# Patient Record
Sex: MALE | Race: WHITE | NOT HISPANIC OR LATINO | Employment: UNEMPLOYED | URBAN - NONMETROPOLITAN AREA
[De-identification: names, ages, dates, MRNs, and addresses within clinical notes are randomized per-mention and may not be internally consistent; named-entity substitution may affect disease eponyms.]

---

## 2025-02-02 ENCOUNTER — APPOINTMENT (OUTPATIENT)
Dept: RADIOLOGY | Facility: HOSPITAL | Age: 70
End: 2025-02-02
Payer: COMMERCIAL

## 2025-02-02 ENCOUNTER — APPOINTMENT (OUTPATIENT)
Dept: CARDIOLOGY | Facility: HOSPITAL | Age: 70
End: 2025-02-02
Payer: COMMERCIAL

## 2025-02-02 ENCOUNTER — HOSPITAL ENCOUNTER (EMERGENCY)
Facility: HOSPITAL | Age: 70
Discharge: HOME | End: 2025-02-02
Attending: EMERGENCY MEDICINE
Payer: COMMERCIAL

## 2025-02-02 VITALS
DIASTOLIC BLOOD PRESSURE: 76 MMHG | BODY MASS INDEX: 24.34 KG/M2 | WEIGHT: 170 LBS | HEIGHT: 70 IN | OXYGEN SATURATION: 97 % | TEMPERATURE: 97.1 F | SYSTOLIC BLOOD PRESSURE: 133 MMHG | RESPIRATION RATE: 18 BRPM | HEART RATE: 70 BPM

## 2025-02-02 DIAGNOSIS — S62.625A CLOSED DISPLACED FRACTURE OF MIDDLE PHALANX OF LEFT RING FINGER, INITIAL ENCOUNTER: Primary | ICD-10-CM

## 2025-02-02 LAB
ALBUMIN SERPL BCP-MCNC: 4.2 G/DL (ref 3.4–5)
ALP SERPL-CCNC: 98 U/L (ref 33–136)
ALT SERPL W P-5'-P-CCNC: 25 U/L (ref 10–52)
ANION GAP SERPL CALC-SCNC: 13 MMOL/L (ref 10–20)
AST SERPL W P-5'-P-CCNC: 26 U/L (ref 9–39)
BILIRUB SERPL-MCNC: 0.4 MG/DL (ref 0–1.2)
BUN SERPL-MCNC: 22 MG/DL (ref 6–23)
CALCIUM SERPL-MCNC: 9.4 MG/DL (ref 8.6–10.3)
CHLORIDE SERPL-SCNC: 103 MMOL/L (ref 98–107)
CO2 SERPL-SCNC: 26 MMOL/L (ref 21–32)
CREAT SERPL-MCNC: 0.88 MG/DL (ref 0.5–1.3)
EGFRCR SERPLBLD CKD-EPI 2021: >90 ML/MIN/1.73M*2
ERYTHROCYTE [DISTWIDTH] IN BLOOD BY AUTOMATED COUNT: 13.4 % (ref 11.5–14.5)
GLUCOSE SERPL-MCNC: 97 MG/DL (ref 74–99)
HCT VFR BLD AUTO: 43.7 % (ref 41–52)
HGB BLD-MCNC: 14.7 G/DL (ref 13.5–17.5)
INR PPP: 4.4 (ref 0.9–1.1)
MCH RBC QN AUTO: 32.6 PG (ref 26–34)
MCHC RBC AUTO-ENTMCNC: 33.6 G/DL (ref 32–36)
MCV RBC AUTO: 97 FL (ref 80–100)
NRBC BLD-RTO: 0 /100 WBCS (ref 0–0)
PLATELET # BLD AUTO: 320 X10*3/UL (ref 150–450)
POTASSIUM SERPL-SCNC: 3.7 MMOL/L (ref 3.5–5.3)
PROT SERPL-MCNC: 7.1 G/DL (ref 6.4–8.2)
PROTHROMBIN TIME: 50.2 SECONDS (ref 9.8–12.8)
RBC # BLD AUTO: 4.51 X10*6/UL (ref 4.5–5.9)
SODIUM SERPL-SCNC: 138 MMOL/L (ref 136–145)
WBC # BLD AUTO: 9.2 X10*3/UL (ref 4.4–11.3)

## 2025-02-02 PROCEDURE — 85610 PROTHROMBIN TIME: CPT | Performed by: EMERGENCY MEDICINE

## 2025-02-02 PROCEDURE — 85027 COMPLETE CBC AUTOMATED: CPT | Performed by: EMERGENCY MEDICINE

## 2025-02-02 PROCEDURE — 72125 CT NECK SPINE W/O DYE: CPT

## 2025-02-02 PROCEDURE — 70450 CT HEAD/BRAIN W/O DYE: CPT

## 2025-02-02 PROCEDURE — 73080 X-RAY EXAM OF ELBOW: CPT | Mod: RIGHT SIDE | Performed by: RADIOLOGY

## 2025-02-02 PROCEDURE — 73130 X-RAY EXAM OF HAND: CPT | Mod: LEFT SIDE | Performed by: RADIOLOGY

## 2025-02-02 PROCEDURE — 72125 CT NECK SPINE W/O DYE: CPT | Performed by: STUDENT IN AN ORGANIZED HEALTH CARE EDUCATION/TRAINING PROGRAM

## 2025-02-02 PROCEDURE — 70450 CT HEAD/BRAIN W/O DYE: CPT | Performed by: STUDENT IN AN ORGANIZED HEALTH CARE EDUCATION/TRAINING PROGRAM

## 2025-02-02 PROCEDURE — 36415 COLL VENOUS BLD VENIPUNCTURE: CPT | Performed by: EMERGENCY MEDICINE

## 2025-02-02 PROCEDURE — 73080 X-RAY EXAM OF ELBOW: CPT | Mod: RT

## 2025-02-02 PROCEDURE — 80053 COMPREHEN METABOLIC PANEL: CPT | Performed by: EMERGENCY MEDICINE

## 2025-02-02 PROCEDURE — 93005 ELECTROCARDIOGRAM TRACING: CPT

## 2025-02-02 PROCEDURE — 73130 X-RAY EXAM OF HAND: CPT | Mod: LT

## 2025-02-02 PROCEDURE — 99285 EMERGENCY DEPT VISIT HI MDM: CPT | Mod: 25 | Performed by: EMERGENCY MEDICINE

## 2025-02-02 ASSESSMENT — PAIN SCALES - GENERAL: PAINLEVEL_OUTOF10: 7

## 2025-02-02 ASSESSMENT — PAIN - FUNCTIONAL ASSESSMENT: PAIN_FUNCTIONAL_ASSESSMENT: 0-10

## 2025-02-02 ASSESSMENT — PAIN DESCRIPTION - ORIENTATION: ORIENTATION: LEFT

## 2025-02-02 ASSESSMENT — PAIN DESCRIPTION - LOCATION: LOCATION: FINGER (COMMENT WHICH ONE)

## 2025-02-02 ASSESSMENT — COLUMBIA-SUICIDE SEVERITY RATING SCALE - C-SSRS
6. HAVE YOU EVER DONE ANYTHING, STARTED TO DO ANYTHING, OR PREPARED TO DO ANYTHING TO END YOUR LIFE?: NO
2. HAVE YOU ACTUALLY HAD ANY THOUGHTS OF KILLING YOURSELF?: NO
1. IN THE PAST MONTH, HAVE YOU WISHED YOU WERE DEAD OR WISHED YOU COULD GO TO SLEEP AND NOT WAKE UP?: NO

## 2025-02-03 LAB
ATRIAL RATE: 70 BPM
P AXIS: 62 DEGREES
P OFFSET: 187 MS
P ONSET: 119 MS
PR INTERVAL: 206 MS
Q ONSET: 222 MS
QRS COUNT: 11 BEATS
QRS DURATION: 98 MS
QT INTERVAL: 396 MS
QTC CALCULATION(BAZETT): 427 MS
QTC FREDERICIA: 417 MS
R AXIS: -4 DEGREES
T AXIS: 60 DEGREES
T OFFSET: 420 MS
VENTRICULAR RATE: 70 BPM

## 2025-02-03 NOTE — ED PROVIDER NOTES
HPI   Chief Complaint   Patient presents with    Fall     Fell due to step breaking, injured left ring finger, right elbow and hit his head, does take warfarin       HPI        Patient History   No past medical history on file.  Past Surgical History:   Procedure Laterality Date    OTHER SURGICAL HISTORY  08/03/2020    Heart surgery     No family history on file.  Social History     Tobacco Use    Smoking status: Not on file    Smokeless tobacco: Not on file   Substance Use Topics    Alcohol use: Not on file    Drug use: Not on file       Physical Exam   ED Triage Vitals [02/02/25 1839]   Temperature Heart Rate Respirations BP   35.8 °C (96.4 °F) 73 16 155/84      Pulse Ox Temp Source Heart Rate Source Patient Position   99 % Temporal Monitor Sitting      BP Location FiO2 (%)     Right arm --       Physical Exam      ED Course & MDM   Diagnoses as of 02/02/25 2005   Closed displaced fracture of middle phalanx of left ring finger, initial encounter                 No data recorded     Yves Coma Scale Score: 15 (02/02/25 1915 : Christy Vora RN)                           Medical Decision Making  EKG interpreted by me normal sinus rhythm with normal intervals and axis        Procedure  Procedures     Umesh Morrison MD  02/02/25 2005

## 2025-02-03 NOTE — ED NOTES
This RN cleaned pt laceration on 4th digit of pt L finger using dermal wound cleanser and Hibiclens. MD Tamiko schaefer bonded the pt finger laceration, then pt finger was splinted.      Colin Packer  02/02/25 2027

## 2025-02-04 ENCOUNTER — TELEPHONE (OUTPATIENT)
Dept: ORTHOPEDIC SURGERY | Facility: CLINIC | Age: 70
End: 2025-02-04
Payer: COMMERCIAL

## 2025-02-06 ENCOUNTER — OFFICE VISIT (OUTPATIENT)
Dept: ORTHOPEDIC SURGERY | Facility: HOSPITAL | Age: 70
End: 2025-02-06
Payer: COMMERCIAL

## 2025-02-06 DIAGNOSIS — S62.615A DISPLACED FRACTURE OF PROXIMAL PHALANX OF LEFT RING FINGER, INITIAL ENCOUNTER FOR CLOSED FRACTURE: Primary | ICD-10-CM

## 2025-02-06 DIAGNOSIS — S62.615A CLOSED DISPLACED FRACTURE OF PROXIMAL PHALANX OF LEFT RING FINGER, INITIAL ENCOUNTER: ICD-10-CM

## 2025-02-06 PROCEDURE — 1160F RVW MEDS BY RX/DR IN RCRD: CPT | Performed by: ORTHOPAEDIC SURGERY

## 2025-02-06 PROCEDURE — 1036F TOBACCO NON-USER: CPT | Performed by: ORTHOPAEDIC SURGERY

## 2025-02-06 PROCEDURE — 99203 OFFICE O/P NEW LOW 30 MIN: CPT | Performed by: ORTHOPAEDIC SURGERY

## 2025-02-06 PROCEDURE — 99213 OFFICE O/P EST LOW 20 MIN: CPT | Performed by: ORTHOPAEDIC SURGERY

## 2025-02-06 PROCEDURE — 1159F MED LIST DOCD IN RCRD: CPT | Performed by: ORTHOPAEDIC SURGERY

## 2025-02-06 RX ORDER — CEFAZOLIN SODIUM 2 G/100ML
2 INJECTION, SOLUTION INTRAVENOUS ONCE
OUTPATIENT
Start: 2025-02-06 | End: 2025-02-06

## 2025-02-06 ASSESSMENT — PAIN SCALES - GENERAL: PAINLEVEL_OUTOF10: 0 - NO PAIN

## 2025-02-06 ASSESSMENT — PAIN - FUNCTIONAL ASSESSMENT: PAIN_FUNCTIONAL_ASSESSMENT: 0-10

## 2025-02-07 ASSESSMENT — ENCOUNTER SYMPTOMS
SHORTNESS OF BREATH: 0
CHILLS: 0
NUMBNESS: 0
FATIGUE: 0
BRUISES/BLEEDS EASILY: 1
FEVER: 0
WHEEZING: 0

## 2025-02-07 NOTE — PROGRESS NOTES
Reason for Appointment  Chief Complaint   Patient presents with    Left Hand - Pain     History of Present Illness  New patient is a 69 y.o. male here today for evaluation of pleasant gentleman status post a fall he does take Coumadin he suffered a displaced fracture of the left ring proximal phalanx there is rotational deformity.  Isolated pain full scanning of his cervical spine head and elbow with x-rays and CT scans were negative isolated pain notices with any flexion how his finger crosses over.  We had a long discussion today isolated injury he wants this fixed.  Understands risk benefits alternatives.  We talked about this at length.  No open injuries    History reviewed. No pertinent past medical history.    Past Surgical History:   Procedure Laterality Date    OTHER SURGICAL HISTORY  08/03/2020    Heart surgery       Medication Documentation Review Audit       Reviewed by Igor Stapleton MD (Physician) on 02/07/25 at 0604      Medication Order Taking? Sig Documenting Provider Last Dose Status            No Medications to Display                                   Allergies   Allergen Reactions    Statins-Hmg-Coa Reductase Inhibitors Unknown     Muscle issues       Review of Systems   Constitutional:  Negative for chills, fatigue and fever.   Respiratory:  Negative for shortness of breath and wheezing.    Cardiovascular:  Negative for chest pain and leg swelling.   Allergic/Immunologic: Negative for immunocompromised state.   Neurological:  Negative for numbness.   Hematological:  Bruises/bleeds easily.       Exam   Exam reveals patient is alert awake no acute distress oriented person place and time mood is good move his neck shoulders elbows and wrist well left hand shows isolated swelling left ring finger with flexion it does cross over the small finger isolated tenderness proximal phalanx no open wound  Assessment   Encounter Diagnoses   Name Primary?    Displaced fracture of proximal phalanx of left ring  finger, initial encounter for closed fracture Yes    Closed displaced fracture of proximal phalanx of left ring finger, initial encounter        Plan     Plan will be for operative treatment he will stop his Coumadin a few days before but check with his cardiologist, we can do this under either local or MAC sedation.  Understands risk benefits alternatives such as nerve artery tendon damage infection and hopeful improvement in rotation but there can still be some deformity and the major risk is stiffness long-term.  Wish to proceed answered all preoperative questions

## 2025-02-10 ENCOUNTER — PRE-ADMISSION TESTING (OUTPATIENT)
Dept: PREADMISSION TESTING | Facility: HOSPITAL | Age: 70
End: 2025-02-10
Payer: COMMERCIAL

## 2025-02-10 VITALS
RESPIRATION RATE: 18 BRPM | TEMPERATURE: 97.9 F | DIASTOLIC BLOOD PRESSURE: 86 MMHG | WEIGHT: 173.72 LBS | HEART RATE: 51 BPM | BODY MASS INDEX: 24.87 KG/M2 | HEIGHT: 70 IN | SYSTOLIC BLOOD PRESSURE: 149 MMHG | OXYGEN SATURATION: 99 %

## 2025-02-10 DIAGNOSIS — S62.615A DISPLACED FRACTURE OF PROXIMAL PHALANX OF LEFT RING FINGER, INITIAL ENCOUNTER FOR CLOSED FRACTURE: ICD-10-CM

## 2025-02-10 PROCEDURE — 99203 OFFICE O/P NEW LOW 30 MIN: CPT | Performed by: NURSE PRACTITIONER

## 2025-02-10 RX ORDER — LISINOPRIL AND HYDROCHLOROTHIAZIDE 12.5; 2 MG/1; MG/1
0.5 TABLET ORAL 2 TIMES DAILY
COMMUNITY
Start: 2025-01-07

## 2025-02-10 RX ORDER — LANOLIN ALCOHOL/MO/W.PET/CERES
500 CREAM (GRAM) TOPICAL EVERY MORNING
COMMUNITY

## 2025-02-10 RX ORDER — ATORVASTATIN CALCIUM 80 MG/1
80 TABLET, FILM COATED ORAL NIGHTLY
COMMUNITY

## 2025-02-10 RX ORDER — POTASSIUM CHLORIDE 20 MEQ/1
20 TABLET, EXTENDED RELEASE ORAL
COMMUNITY

## 2025-02-10 RX ORDER — PANTOPRAZOLE SODIUM 40 MG/1
40 TABLET, DELAYED RELEASE ORAL
COMMUNITY
Start: 2020-08-05

## 2025-02-10 RX ORDER — WARFARIN 7.5 MG/1
7.5 TABLET ORAL EVERY EVENING
COMMUNITY

## 2025-02-10 RX ORDER — TAMSULOSIN HYDROCHLORIDE 0.4 MG/1
0.4 CAPSULE ORAL NIGHTLY
COMMUNITY

## 2025-02-10 ASSESSMENT — DUKE ACTIVITY SCORE INDEX (DASI)
CAN YOU WALK A BLOCK OR TWO ON LEVEL GROUND: YES
CAN YOU CLIMB A FLIGHT OF STAIRS OR WALK UP A HILL: YES
CAN YOU DO HEAVY WORK AROUND THE HOUSE LIKE SCRUBBING FLOORS OR LIFTING AND MOVING HEAVY FURNITURE: YES
CAN YOU RUN A SHORT DISTANCE: YES
CAN YOU DO LIGHT WORK AROUND THE HOUSE LIKE DUSTING OR WASHING DISHES: YES
CAN YOU DO MODERATE WORK AROUND THE HOUSE LIKE VACUUMING, SWEEPING FLOORS OR CARRYING GROCERIES: YES
TOTAL_SCORE: 50.7
DASI METS SCORE: 9
CAN YOU WALK INDOORS, SUCH AS AROUND YOUR HOUSE: YES
CAN YOU PARTICIPATE IN MODERATE RECREATIONAL ACTIVITIES LIKE GOLF, BOWLING, DANCING, DOUBLES TENNIS OR THROWING A BASEBALL OR FOOTBALL: YES
CAN YOU HAVE SEXUAL RELATIONS: YES
CAN YOU PARTICIPATE IN STRENOUS SPORTS LIKE SWIMMING, SINGLES TENNIS, FOOTBALL, BASKETBALL, OR SKIING: NO
CAN YOU TAKE CARE OF YOURSELF (EAT, DRESS, BATHE, OR USE TOILET): YES
CAN YOU DO YARD WORK LIKE RAKING LEAVES, WEEDING OR PUSHING A MOWER: YES

## 2025-02-10 ASSESSMENT — PAIN DESCRIPTION - DESCRIPTORS: DESCRIPTORS: DULL

## 2025-02-10 ASSESSMENT — PAIN SCALES - GENERAL: PAINLEVEL_OUTOF10: 2

## 2025-02-10 ASSESSMENT — PAIN - FUNCTIONAL ASSESSMENT: PAIN_FUNCTIONAL_ASSESSMENT: 0-10

## 2025-02-10 NOTE — CPM/PAT H&P
CPM/PAT Evaluation       Name: Ludwig Alcazar (Ludwig Alcazar)  /Age: 1955/69 y.o.     Visit Type:   In-Person       Chief Complaint: Closed displaced fracture of proximal phalanx of left ring finger, initial encounter     HPI  Patient is an alert and oriented 69 year old male scheduled for a  PINNING, DIGIT, HAND, PERCUTANEOUS  on 24 with Dr. Stapleton for  Closed displaced fracture of proximal phalanx of left ring finger, initial encounter . PMHX includes CABG x 2 w/aortic valve replacement in . Presents to [facility] [place] [place] PAT today for perioperative risk stratification and optimization.     No past medical history on file.    Past Surgical History:   Procedure Laterality Date    OTHER SURGICAL HISTORY  2020    Heart surgery       Patient Sexual activity questions deferred to the physician.    No family history on file.    Allergies   Allergen Reactions    Statins-Hmg-Coa Reductase Inhibitors Unknown     Muscle issues with Rosuvastatin per pt report       Prior to Admission medications    Not on File         Review of Systems   Constitutional: Negative for chills, decreased appetite, diaphoresis, fever and malaise/fatigue.   Eyes:  Negative for blurred vision and double vision.   Cardiovascular:  Negative for chest pain, claudication, cyanosis, dyspnea on exertion, irregular heartbeat, leg swelling, near-syncope and palpitations.   Respiratory:  Negative for cough, hemoptysis, shortness of breath and wheezing.    Endocrine: Negative for cold intolerance, heat intolerance, polydipsia, polyphagia and polyuria.   Gastrointestinal:  Negative for abdominal pain, constipation, diarrhea, dysphagia, nausea and vomiting.   Genitourinary:  Negative for bladder incontinence, dysuria, hematuria, incomplete emptying, nocturia, frequency, pelvic pain and urgency.   Neurological:  Negative for headaches, light-headedness, paresthesias, sensory change and weakness.   Psychiatric/Behavioral:  Negative for altered  mental status.    Musculoskeletal: Positive for myalgias, arthralgias     Vitals and nursing note reviewed.     Physical exam  Constitutional:       Appearance: Normal appearance. He is Normal.   HENT:      Head: Normocephalic and atraumatic.      Mouth/Throat:      Mouth: Mucous membranes are moist.      Pharynx: Oropharynx is clear.   Eyes:      Extraocular Movements: Extraocular movements intact.      Conjunctiva/sclera: Conjunctivae normal.      Pupils: Pupils are equal, round, and reactive to light.   Cardiovascular:      PMI at left midclavicular line. Normal rate. Regular rhythm. Normal S1. Normal S2.       Murmurs: There is no murmur.      No gallop.  No click. No rub.       No audible carotid bruit     No lower extremity edema on exam  Pulmonary:      Effort: Pulmonary effort is normal.      Breath sounds: Normal breath sounds.   Abdominal:      General: Abdomen is flat. Bowel sounds are normal.      Palpations: Abdomen is soft and non-tender  Musculoskeletal:      Cervical back: Normal range of motion and neck supple.   Skin:     General: Skin is warm and dry.      Capillary Refill: Capillary refill takes less than 2 seconds.   Neurological:      General: No focal deficit present.      Mental Status: He is alert and oriented to person, place, and time. Mental status is at baseline.   Psychiatric:         Mood and Affect: Mood normal.         Behavior: Behavior normal.         Thought Content: Thought content normal.         Judgment: Judgment normal.     Vitals and nursing note reviewed. Physical exam within normal limits.       DASI Risk Score      Flowsheet Row Pre-Admission Testing from 2/10/2025 in [facility]   Can you take care of yourself (eat, dress, bathe, or use toilet)?  2.75 filed at 02/10/2025 0950   Can you walk indoors, such as around your house? 1.75 filed at 02/10/2025 0950   Can you walk a block or two on level ground?  2.75 filed at 02/10/2025 0950   Can you climb a  flight of stairs or walk up a hill? 5.5 filed at 02/10/2025 0950   Can you run a short distance? 8 filed at 02/10/2025 0950   Can you do light work around the house like dusting or washing dishes? 2.7 filed at 02/10/2025 0950   Can you do moderate work around the house like vacuuming, sweeping floors or carrying groceries? 3.5 filed at 02/10/2025 0950   Can you do heavy work around the house like scrubbing floors or lifting and moving heavy furniture?  8 filed at 02/10/2025 0950   Can you do yard work like raking leaves, weeding or pushing a mower? 4.5 filed at 02/10/2025 0950   Can you have sexual relations? 5.25 filed at 02/10/2025 0950   Can you participate in moderate recreational activities like golf, bowling, dancing, doubles tennis or throwing a baseball or football? 6 filed at 02/10/2025 0950   Can you participate in strenous sports like swimming, singles tennis, football, basketball, or skiing? 0 filed at 02/10/2025 0950   DASI SCORE 50.7 filed at 02/10/2025 0950   METS Score (Will be calculated only when all the questions are answered) 9 filed at 02/10/2025 0950          Chris DVT Assessment      Flowsheet Row Pre-Admission Testing from 2/10/2025 in [facility]   DVT Score (IF A SCORE IS NOT CALCULATING, MUST SELECT A BMI TO COMPLETE) 5 filed at 02/10/2025 0948   Surgical Factors Major surgery planned, including arthroscopic and laproscopic (1-2 hours) filed at 02/10/2025 0948   BMI (BMI MUST BE CHOSEN) 30 or less filed at 02/10/2025 0948          Modified Frailty Index    No data to display       CHADS2 Stroke Risk  Current as of 31 minutes ago        N/A 3 to 100%: High Risk   2 to < 3%: Medium Risk   0 to < 2%: Low Risk     Last Change: N/A          This score determines the patient's risk of having a stroke if the patient has atrial fibrillation.        This score is not applicable to this patient. Components are not calculated.          Revised Cardiac Risk Index      Flowsheet Row  Pre-Admission Testing from 2/10/2025 in [facility]   High-Risk Surgery (Intraperitoneal, Intrathoracic,Suprainguinal vascular) 0 filed at 02/10/2025 0950   History of ischemic heart disease (History of MI, History of positive exercuse test, Current chest paint considered due to myocardial ischemia, Use of nitrate therapy, ECG with pathological Q Waves) 0 filed at 02/10/2025 0950   History of congestive heart failure (pulmonary edemia, bilateral rales or S3 gallop, Paroxysmal nocturnal dyspnea, CXR showing pulmonary vascular redistribution) 0 filed at 02/10/2025 0950   History of cerebrovascular disease (Prior TIA or stroke) 0 filed at 02/10/2025 0950   Pre-operative insulin treatment 0 filed at 02/10/2025 0950   Pre-operative creatinine>2 mg/dl 0 filed at 02/10/2025 0950   Revised Cardiac Risk Calculator 0 filed at 02/10/2025 0950          Apfel Simplified Score    No data to display       Risk Analysis Index Results This Encounter    No data found in the last 10 encounters.       Stop Bang Score      Flowsheet Row Pre-Admission Testing from 2/10/2025 in [facility]   Do you snore loudly? 0 filed at 02/10/2025 0911   Do you often feel tired or fatigued after your sleep? 0 filed at 02/10/2025 0911   Has anyone ever observed you stop breathing in your sleep? 0 filed at 02/10/2025 0911   Do you have or are you being treated for high blood pressure? 1 filed at 02/10/2025 0911   Recent BMI (Calculated) 24.9 filed at 02/10/2025 0911   Is BMI greater than 35 kg/m2? 0=No filed at 02/10/2025 0911   Age older than 50 years old? 1=Yes filed at 02/10/2025 0911   Is your neck circumference greater than 17 inches (Male) or 16 inches (Female)? 0 filed at 02/10/2025 0911   Gender - Male 1=Yes filed at 02/10/2025 0911   STOP-BANG Total Score 3 filed at 02/10/2025 0911          Prodigy: High Risk  Total Score: 16              Prodigy Age Score      Prodigy Gender Score          ARISCAT Score for  Postoperative Pulmonary Complications    No data to display       Kait Perioperative Risk for Myocardial Infarction or Cardiac Arrest (JENNIE)      Flowsheet Row Pre-Admission Testing from 2/10/2025 in [facility]   Calculated Age Score 1.38 filed at 02/10/2025 0951   Functional Status  0 filed at 02/10/2025 0951   ASA Class  -3.29 filed at 02/10/2025 0951   Creatinine 0 filed at 02/10/2025 0951   Type of Procedure  0.80 filed at 02/10/2025 0951   JENNIE Total Score  -6.36 filed at 02/10/2025 0951   JENNIE % 0.17 filed at 02/10/2025 0951          Assessment & Plan:    Neuro:  No diagnosis or significant findings on chart review or clinical presentation and evaluation.     HEENT/Airway:  No diagnosis or significant findings on chart review or clinical presentation and evaluation.   STOP-BANG Score-3 points moderaterisk for DEEJAY    Mallampati::  II    TM distance::  >3 FB    Neck ROM::  Full  Dentures-reports both upper and lower  Crowns-denies  Implants-denies    Cardiovascular:  HTN, CABG x 2 (atorvastatin, lisinopril-hydrochlorothiazide, potassium, warfarin)   METS: 9  RCRI: 0 points, 3.9%  risk for postoperative MACE   JENNIE: 0.17% risk for postoperative MACE  EKG -normal sinus rhythm   Clearance received from cardiology - pt to stop coumadin no more than 5 days prior to surgery   Nuclear stress test 4/2021 - per pt coumadin was stopped on 2/7/25  IMPRESSION:  1. No definite evidence of ischemia.  2. In the setting of likely diaphragmatic attenuation, a small basal  inferior wall infarct can not be definitively excluded.  3. Normal left ventricular size.  4. Left ventricular ejection fraction was calculated to be 61% which  is within normal limits.  ECHO 4/2021  CONCLUSIONS:   1. The left ventricular systolic function is normal with a 60-65% estimated ejection fraction.   2. Spectral Doppler shows an impaired relaxation pattern of left ventricular diastolic filling.   3. There is moderate mitral annular  calcification.   4. There is a mechanical aortic valve prosthesis present. Normal function. Mean gradient 8 mm Hg.   5. Mild concentric LVH. Moderate basal septal hypertrophy.   6. Dilated ascending aorta, 4.3 cm.    Pulmonary:  No diagnosis or significant findings on chart review or clinical presentation and evaluation.   ARISCAT: <26 points, 1.6% risk of in-hospital postoperative pulmonary complication  PRODIGY: Low risk for opioid induced respiratory depression  Smoking History- quit smoking >25 years ago   Deep breathing handout given    Renal/Urinary:    BPH (tamsulosin)   however, the patient is at increased risk of perioperative renal complications secondary to HTN. Preventative measures include BP monitoring, medication compliance, and hydration management.   CMP  Creatinine-0.88  GFR->90    Endocrine:  No diagnosis or significant findings on chart review or clinical presentation and evaluation.     Hematologic/Immunology:  No diagnosis or significant findings on chart review or clinical presentation and evaluation.  The patient is not a Jehovah’s witness and will accept blood and blood products if medically indicated.   History of previous blood transfusions No  CBC  H/H 14.7/43.7  Caprini Score 5, patient at Moderate for postoperative DVT. Pt supplied education/VTE handout  Anticoagulation use: Yes     Gastrointestinal:   GERD (pantoprazole)  Recreational drug use:  marijuana gummy at bedtime  Alcohol use 2 liquor drinks per week    Infectious disease:   No diagnosis or significant findings on chart review or clinical presentation and evaluation.     Musculoskeletal:   No diagnosis or significant findings on chart review or clinical presentation and evaluation.   JHFRAT score-4 points. low risk for falls    Anesthesia:  ASA 2 - Patient with mild systemic disease with no functional limitations  Anticipated anesthesia-regional  History of General anesthesia- yes  Complications- No anesthesia  complications  No family history of anesthesia complications    Abnormalities noted on PAT evaluation: No    Labs & Imaging ordered:  Nickel/metal allergy-negative  Shellfish allergy-negative    Discussed with patient medication instructions, NPO guidelines, and any questions or concerns.      time spent  35

## 2025-02-10 NOTE — H&P (VIEW-ONLY)
CPM/PAT Evaluation       Name: Ludwig Alcazar (Ludwig Alcazar)  /Age: 1955/69 y.o.     Visit Type:   In-Person       Chief Complaint: Closed displaced fracture of proximal phalanx of left ring finger, initial encounter     HPI  Patient is an alert and oriented 69 year old male scheduled for a  PINNING, DIGIT, HAND, PERCUTANEOUS  on 24 with Dr. Stapleton for  Closed displaced fracture of proximal phalanx of left ring finger, initial encounter . PMHX includes CABG x 2 w/aortic valve replacement in . Pres[facility] to [facility] today for perioperative risk stratification and optimization.     No past medical history on file.    Past Surgical History:   Procedure Laterality Date    OTHER SURGICAL HISTORY  2020    Heart surgery       Patient Sexual activity questions deferred to the physician.    No family history on file.    Allergies   Allergen Reactions    Statins-Hmg-Coa Reductase Inhibitors Unknown     Muscle issues with Rosuvastatin per pt report       Prior to Admission medications    Not on File         Review of Systems   Constitutional: Negative for chills, decreased appetite, diaphoresis, fever and malaise/fatigue.   Eyes:  Negative for blurred vision and double vision.   Cardiovascular:  Negative for chest pain, claudication, cyanosis, dyspnea on exertion, irregular heartbeat, leg swelling, near-syncope and palpitations.   Respiratory:  Negative for cough, hemoptysis, shortness of breath and wheezing.    Endocrine: Negative for cold intolerance, heat intolerance, polydipsia, polyphagia and polyuria.   Gastrointestinal:  Negative for abdominal pain, constipation, diarrhea, dysphagia, nausea and vomiting.   Genitourinary:  Negative for bladder incontinence, dysuria, hematuria, incomplete emptying, nocturia, frequency, pelvic pain and urgency.   Neurological:  Negative for headaches, light-headedness, paresthesias, sensory change and weakness.   Psychiatric/Behavioral:  Negative for altered  mental status.    Musculoskeletal: Positive for myalgias, arthralgias     Vitals and nursing note reviewed.     Physical exam  Constitutional:       Appearance: Normal appearance. He is Normal.   HENT:      Head: Normocephalic and atraumatic.      Mouth/Throat:      Mouth: Mucous membranes are moist.      Pharynx: Oropharynx is clear.   Eyes:      Extraocular Movements: Extraocular movements intact.      Conjunctiva/sclera: Conjunctivae normal.      Pupils: Pupils are equal, round, and reactive to light.   Cardiovascular:      PMI at left midclavicular line. Normal rate. Regular rhythm. Normal S1. Normal S2.       Murmurs: There is no murmur.      No gallop.  No click. No rub.       No audible carotid bruit     No lower extremity edema on exam  Pulmonary:      Effort: Pulmonary effort is normal.      Breath sounds: Normal breath sounds.   Abdominal:      General: Abdomen is flat. Bowel sounds are normal.      Palpations: Abdomen is soft and non-tender  Musculoskeletal:      Cervical back: Normal range of motion and neck supple.   Skin:     General: Skin is warm and dry.      Capillary Refill: Capillary refill takes less than 2 seconds.   Neurological:      General: No focal deficit present.      Mental Status: He is alert and oriented to person, place, and time. Mental status is at baseline.   Psychiatric:         Mood and Affect: Mood normal.         Behavior: Behavior normal.         Thought Content: Thought content normal.         Judgment: Judgment normal.     Vitals and nursing note reviewed. Physical exam within normal limits.       DASI Risk Score      Flowsheet Row Pre-Admission Testing from 2/10/2025 in [facility]   Can you take care of yourself (eat, dress, bathe, or use toilet)?  2.75 filed at 02/10/2025 0950   Can you walk indoors, such as around your house? 1.75 filed at 02/10/2025 0950   Can you walk a block or two on level ground?  2.75 filed at 02/10/2025 0950   Can you climb a  flight of stairs or walk up a hill? 5.5 filed at 02/10/2025 0950   Can you run a short distance? 8 filed at 02/10/2025 0950   Can you do light work around the house like dusting or washing dishes? 2.7 filed at 02/10/2025 0950   Can you do moderate work around the house like vacuuming, sweeping floors or carrying groceries? 3.5 filed at 02/10/2025 0950   Can you do heavy work around the house like scrubbing floors or lifting and moving heavy furniture?  8 filed at 02/10/2025 0950   Can you do yard work like raking leaves, weeding or pushing a mower? 4.5 filed at 02/10/2025 0950   Can you have sexual relations? 5.25 filed at 02/10/2025 0950   Can you participate in moderate recreational activities like golf, bowling, dancing, doubles tennis or throwing a baseball or football? 6 filed at 02/10/2025 0950   Can you participate in strenous sports like swimming, singles tennis, football, basketball, or skiing? 0 filed at 02/10/2025 0950   DASI SCORE 50.7 filed at 02/10/2025 0950   METS Score (Will be calculated only when all the questions are answered) 9 filed at 02/10/2025 0950          Chris DVT Assessment      Flowsheet Row Pre-Admission Testing from 2/10/2025 in [facility]   DVT Score (IF A SCORE IS NOT CALCULATING, MUST SELECT A BMI TO COMPLETE) 5 filed at 02/10/2025 0948   Surgical Factors Major surgery planned, including arthroscopic and laproscopic (1-2 hours) filed at 02/10/2025 0948   BMI (BMI MUST BE CHOSEN) 30 or less filed at 02/10/2025 0948          Modified Frailty Index    No data to display       CHADS2 Stroke Risk  Current as of 31 minutes ago        N/A 3 to 100%: High Risk   2 to < 3%: Medium Risk   0 to < 2%: Low Risk     Last Change: N/A          This score determines the patient's risk of having a stroke if the patient has atrial fibrillation.        This score is not applicable to this patient. Components are not calculated.          Revised Cardiac Risk Index      Flowsheet Row  Pre-Admission Testing from 2/10/2025 in [facility]   High-Risk Surgery (Intraperitoneal, Intrathoracic,Suprainguinal vascular) 0 filed at 02/10/2025 0950   History of ischemic heart disease (History of MI, History of positive exercuse test, Current chest paint considered due to myocardial ischemia, Use of nitrate therapy, ECG with pathological Q Waves) 0 filed at 02/10/2025 0950   History of congestive heart failure (pulmonary edemia, bilateral rales or S3 gallop, Paroxysmal nocturnal dyspnea, CXR showing pulmonary vascular redistribution) 0 filed at 02/10/2025 0950   History of cerebrovascular disease (Prior TIA or stroke) 0 filed at 02/10/2025 0950   Pre-operative insulin treatment 0 filed at 02/10/2025 0950   Pre-operative creatinine>2 mg/dl 0 filed at 02/10/2025 0950   Revised Cardiac Risk Calculator 0 filed at 02/10/2025 0950          Apfel Simplified Score    No data to display       Risk Analysis Index Results This Encounter    No data found in the last 10 encounters.       Stop Bang Score      Flowsheet Row Pre-Admission Testing from 2/10/2025 in [facility]   Do you snore loudly? 0 filed at 02/10/2025 0911   Do you often feel tired or fatigued after your sleep? 0 filed at 02/10/2025 0911   Has anyone ever observed you stop breathing in your sleep? 0 filed at 02/10/2025 0911   Do you have or are you being treated for high blood pressure? 1 filed at 02/10/2025 0911   Recent BMI (Calculated) 24.9 filed at 02/10/2025 0911   Is BMI greater than 35 kg/m2? 0=No filed at 02/10/2025 0911   Age older than 50 years old? 1=Yes filed at 02/10/2025 0911   Is your neck circumference greater than 17 inches (Male) or 16 inches (Female)? 0 filed at 02/10/2025 0911   Gender - Male 1=Yes filed at 02/10/2025 0911   STOP-BANG Total Score 3 filed at 02/10/2025 0911          Prodigy: High Risk  Total Score: 16              Prodigy Age Score      Prodigy Gender Score          ARISCAT Score for  Postoperative Pulmonary Complications    No data to display       Kait Perioperative Risk for Myocardial Infarction or Cardiac Arrest (JENNIE)      Flowsheet Row Pre-Admission Testing from 2/10/2025 in [facility]   Calculated Age Score 1.38 filed at 02/10/2025 0951   Functional Status  0 filed at 02/10/2025 0951   ASA Class  -3.29 filed at 02/10/2025 0951   Creatinine 0 filed at 02/10/2025 0951   Type of Procedure  0.80 filed at 02/10/2025 0951   JENNIE Total Score  -6.36 filed at 02/10/2025 0951   JENNIE % 0.17 filed at 02/10/2025 0951          Assessment & Plan:    Neuro:  No diagnosis or significant findings on chart review or clinical presentation and evaluation.     HEENT/Airway:  No diagnosis or significant findings on chart review or clinical presentation and evaluation.   STOP-BANG Score-3 points moderaterisk for DEEJAY    Mallampati::  II    TM distance::  >3 FB    Neck ROM::  Full  Dentures-reports both upper and lower  Crowns-denies  Implants-denies    Cardiovascular:  HTN, CABG x 2 (atorvastatin, lisinopril-hydrochlorothiazide, potassium, warfarin)   METS: 9  RCRI: 0 points, 3.9%  risk for postoperative MACE   JENNIE: 0.17% risk for postoperative MACE  EKG -normal sinus rhythm   Clearance received from cardiology - pt to stop coumadin no more than 5 days prior to surgery   Nuclear stress test 4/2021 - per pt coumadin was stopped on 2/7/25  IMPRESSION:  1. No definite evidence of ischemia.  2. In the setting of likely diaphragmatic attenuation, a small basal  inferior wall infarct can not be definitively excluded.  3. Normal left ventricular size.  4. Left ventricular ejection fraction was calculated to be 61% which  is within normal limits.  ECHO 4/2021  CONCLUSIONS:   1. The left ventricular systolic function is normal with a 60-65% estimated ejection fraction.   2. Spectral Doppler shows an impaired relaxation pattern of left ventricular diastolic filling.   3. There is moderate mitral annular  calcification.   4. There is a mechanical aortic valve prosthesis present. Normal function. Mean gradient 8 mm Hg.   5. Mild concentric LVH. Moderate basal septal hypertrophy.   6. Dilated ascending aorta, 4.3 cm.    Pulmonary:  No diagnosis or significant findings on chart review or clinical presentation and evaluation.   ARISCAT: <26 points, 1.6% risk of in-hospital postoperative pulmonary complication  PRODIGY: Low risk for opioid induced respiratory depression  Smoking History- quit smoking >25 years ago   Deep breathing handout given    Renal/Urinary:    BPH (tamsulosin)   however, the patient is at increased risk of perioperative renal complications secondary to HTN. Preventative measures include BP monitoring, medication compliance, and hydration management.   CMP  Creatinine-0.88  GFR->90    Endocrine:  No diagnosis or significant findings on chart review or clinical presentation and evaluation.     Hematologic/Immunology:  No diagnosis or significant findings on chart review or clinical presentation and evaluation.  The patient is not a Jehovah’s witness and will accept blood and blood products if medically indicated.   History of previous blood transfusions No  CBC  H/H 14.7/43.7  Caprini Score 5, patient at Moderate for postoperative DVT. Pt supplied education/VTE handout  Anticoagulation use: Yes     Gastrointestinal:   GERD (pantoprazole)  Recreational drug use:  marijuana gummy at bedtime  Alcohol use 2 liquor drinks per week    Infectious disease:   No diagnosis or significant findings on chart review or clinical presentation and evaluation.     Musculoskeletal:   No diagnosis or significant findings on chart review or clinical presentation and evaluation.   JHFRAT score-4 points. low risk for falls    Anesthesia:  ASA 2 - Patient with mild systemic disease with no functional limitations  Anticipated anesthesia-regional  History of General anesthesia- yes  Complications- No anesthesia  complications  No family history of anesthesia complications    Abnormalities noted on PAT evaluation: No    Labs & Imaging ordered:  Nickel/metal allergy-negative  Shellfish allergy-negative    Discussed with patient medication instructions, NPO guidelines, and any questions or concerns.      time spent  35

## 2025-02-10 NOTE — PREPROCEDURE INSTRUCTIONS
Medication List            Accurate as of February 10, 2025  9:17 AM. Always use your most recent med list.                atorvastatin 80 mg tablet  Commonly known as: Lipitor  Medication Adjustments for Surgery: Take/Use as prescribed     cyanocobalamin 1,000 mcg tablet  Commonly known as: Vitamin B-12  Additional Medication Adjustments for Surgery: Take last dose 7 days before surgery  Notes to patient: Pt should stop this medication today if not already stopped     lisinopriL-hydrochlorothiazide 20-12.5 mg tablet  Medication Adjustments for Surgery: Take last dose 1 day (24 hours) before surgery  Notes to patient: Last dose preoperatively if taken in the mornings on 2/11/25 if taken in the evenings on 2/10/25     MAGNESIUM ORAL  Medication Adjustments for Surgery: Do Not take on the morning of surgery     pantoprazole 40 mg EC tablet  Commonly known as: ProtoNix  Medication Adjustments for Surgery: Take/Use as prescribed     potassium chloride CR 20 mEq ER tablet  Commonly known as: Klor-Con M20  Medication Adjustments for Surgery: Take/Use as prescribed     tamsulosin 0.4 mg 24 hr capsule  Commonly known as: Flomax  Medication Adjustments for Surgery: Take/Use as prescribed     warfarin 7.5 mg tablet  Commonly known as: Coumadin  Additional Medication Adjustments for Surgery: Take last dose 5 days before surgery  Notes to patient: Last dose preoperatively on 2/7/25, pt is being seen on 2/10 will have pt hold if not already done so             NPO Instructions:     Do not eat any food or drink liquid after midnight the night before your surgery/procedure.  Additional Instructions:      Seven/Six Days before Surgery:  Review your medication instructions, stop indicated medications  Five Days before Surgery:  Review your medication instructions, stop indicated medications  Three Days before Surgery:  Review your medication instructions, stop indicated medications  The Day before Surgery:  No smoking or alcohol  use 24 hours before surgery  Review your medication instructions, stop indicated medications  You will be contacted regarding the time of your arrival to facility and surgery time  Do not eat any food after Midnight  Day of Surgery:  Review your medication instructions, take indicated medications  If you have diabetes, please check your fasting blood sugar upon awakening.  If fasting blood sugar is <80 mg/dl, drink 100 ml of apple juice, time limit of 2 hours before  Wear  comfortable loose fitting clothing  Do not use moisturizers, creams, lotions or perfume  All jewelry and valuables should be left at home     CONTACT SURGEON'S OFFICE IF YOU DEVELOP:  * Fever = 100.4 F   * New respiratory symptoms (e.g. cough, shortness of breath, respiratory distress, sore throat)  * Recent loss of taste or smell  *Flu like symptoms such as headache, fatigue or gastrointestinal symptoms  * You develop any open sores, shingles, burning or painful urination   AND/OR:  * You no longer wish to have the surgery.  * Any other personal circumstances change that may lead to the need to cancel or defer this surgery.  *You were admitted to any hospital within one week of your planned procedure.     SMOKING:  *Quitting smoking can make a huge difference to your health and recovery from surgery.    *If you need help with quitting, call 5-999-QUIT-NOW.     THE DAY BEFORE SURGERY:  *Do not eat any food after midnight the night before your surgery.   SURGICAL TIME:  *You will be contacted between 2 p.m. and 3 p.m. the business day before your surgery with your arrival time.  *If you haven't received a call by 3pm, call [phone]  *Scheduled surgery times may change and you will be notified if this occurs-check your personal voicemail for any updates.     ON THE MORNING OF SURGERY:  *Wear comfortable, loose fitting clothing.   *Do not use moisturizers, creams, lotions or perfume.  *All jewelry and valuables should be left at  home.  *Prosthetic devices such as contact lenses, hearing aids, dentures, eyelash extensions, hairpins and body piercing must be removed before surgery.     BRING WITH YOU:  *Photo ID and insurance card  *Current list of medications and allergies  *Pacemaker/Defibrillator/Heart stent cards  *CPAP machine and mask  *Slings/splints/crutches  *Copy of your complete Advanced Directive/DHPOA-if applicable  *Neurostimulator implant remote     PARKING AND ARRIVAL:  *Check in at the Main Entrance desk and let them know you are here for surgery.     IF YOU ARE HAVING OUTPATIENT/SAME DAY SURGERY:  *A responsible adult MUST accompany you at the time of discharge and stay with you for 24 hours after your surgery.  *You may NOT drive yourself home after surgery.  *You may use a taxi or ride sharing service ([employer]) to return home ONLY if you are accompanied by a friend or family member.  *Instructions for resuming your medications will be provided by your surgeon.     Thank you for coming to Pre Admission testing.      If I have prescribed medication please don't forget to  at your pharmacy.      Any questions about today's visit call [phone] and leave a message in the general mailbox.     Patient instructed to ambulate as soon as possible postoperatively to decrease thromboembolic risk.     JODIE Livingston-CNP     Thank you for visiting [facility].  If you have any changes to your health condition, please call the surgeons office to alert them and give them details of your symptoms.        Preoperative Fasting Guidelines     Why must I stop eating and drinking near surgery time?  With sedation, food or liquid in your stomach can enter your lungs causing serious complications  Increases nausea and vomiting     When do I need to stop eating and drinking before my surgery?  Do not eat any food after midnight the night before your surgery/procedure.        Additional Instructions:       The Day before Surgery:  -Review your medication instructions, stop indicated medications  -You will be contacted in the evening regarding the time of your arrival to facility and surgery time     Day of Surgery:  -Review your medication instructions, take indicated medications  -Wear comfortable loose fitting clothing  -Do not use moisturizers, creams, lotions or perfume  -All jewelry and valuables should be left at home                   Preoperative Brain Exercises     What are brain exercises?  A brain exercise is any activity that engages your thinking (cognitive) skills.     What types of activities are considered brain exercises?  Jigsaw puzzles, crossword puzzles, word jumble, memory games, word search, and many more.  Many can be found free online or on your phone via a mobile orestes.     Why should I do brain exercises before my surgery?  More recent research has shown brain exercise before surgery can lower the risk of postoperative delirium (confusion) which can be especially important for older adults.  Patients who did brain exercises for 5 to 10 minutes/day the days before surgery, cut their risk of postoperative delirium in half up to 1 week after surgery.                         [facility]     Preoperative Deep Breathing Exercises     Why it is important to do deep breathing exercises before my surgery?  Deep breathing exercises strengthen your breathing muscles.  This helps you to recover after your surgery and decreases the chance of breathing complications.        How are the deep breathing exercises done?  Sit straight with your back supported.  Breathe in deeply and slowly through your nose. Your lower rib cage should expand and your abdomen may move forward.  Hold that breath for 3 to 5 seconds.  Breathe out through pursed lips, slowly and completely.  Rest and repeat 10 times every hour while awake.  Rest longer if you become dizzy or lightheaded.                       [facility]     Preoperative Deep Breathing Exercises     Why it is important to do deep breathing exercises before my surgery?  Deep breathing exercises strengthen your breathing muscles.  This helps you to recover after your surgery and decreases the chance of breathing complications.        How are the deep breathing exercises done?  Sit straight with your back supported.  Breathe in deeply and slowly through your nose. Your lower rib cage should expand and your abdomen may move forward.  Hold that breath for 3 to 5 seconds.  Breathe out through pursed lips, slowly and completely.  Rest and repeat 10 times every hour while awake.  Rest longer if you become dizzy or lightheaded.        Patient Information: Incentive Spirometer  What is an incentive spirometer?  An incentive spirometer is a device used before and after surgery to “exercise” your lungs.  It helps you to take deeper breaths to expand your lungs.  Below is an example of a basic incentive spirometer.  The device you receive may differ slightly but they all function the same.    Why do I need to use an incentive spirometer?  Using your incentive spirometer prepares your lungs for surgery and helps prevent lung problems after surgery.  How do I use my incentive spirometer?  When you're using your incentive spirometer, make sure to breathe through your mouth. If you breathe through your nose, the incentive spirometer won't work properly. You can hold your nose if you have trouble.  If you feel dizzy at any time, stop and rest. Try again at a later time.  Follow the steps below:  Set up your incentive spirometer, expand the flexible tubing and connect to the outlet.  Sit upright in a chair or bed. Hold the incentive spirometer at eye level.   Put the mouthpiece in your mouth and close your lips tightly around it. Slowly breathe out (exhale) completely.  Breathe in (inhale) slowly through your mouth as deeply as you can. As you  take a breath, you will see the piston rise inside the large column. While the piston rises, the indicator should move upwards. It should stay in between the 2 arrows (see Figure).  Try to get the piston as high as you can, while keeping the indicator between the arrows.   If the indicator doesn't stay between the arrows, you're breathing either too fast or too slow.  When you get it as high as you can, hold your breath for 10 seconds, or as long as possible. While you're holding your breath, the piston will slowly fall to the base of the spirometer.  Once the piston reaches the bottom of the spirometer, breathe out slowly through your mouth. Rest for a few seconds.  Repeat 10 times. Try to get the piston to the same level with each breath.  Repeat every hour while awake  You can carefully clean the outside of the mouthpiece with an alcohol wipe or soap and water.       Patient and Family Education             Ways You Can Help Prevent Blood Clots                    This handout explains some simple things you can do to help prevent blood clots.      Blood clots are blockages that can form in the body's veins. When a blood clot forms in your deep veins, it may be called a deep vein thrombosis, or DVT for short. Blood clots can happen in any part of the body where blood flows, but they are most common in the arms and legs. If a piece of a blood clot breaks free and travels to the lungs, it is called a pulmonary embolus (PE). A PE can be a very serious problem.         Being in the hospital or having surgery can raise your chances of getting a blood clot because you may not be well enough to move around as much as you normally do.         Ways you can help prevent blood clots in the hospital           Wearing SCDs. SCDs stands for Sequential Compression Devices.   SCDs are special sleeves that wrap around your legs  They attach to a pump that fills them with air to gently squeeze your legs every few minutes.   This  helps return the blood in your legs to your heart.   SCDs should only be taken off when walking or bathing.   SCDs may not be comfortable, but they can help save your life.                                            Wearing compression stockings - if your doctor orders them. These special snug fitting stockings gently squeeze your legs to help blood flow.       Walking. Walking helps move the blood in your legs.   If your doctor says it is ok, try walking the halls at least   5 times a day. Ask us to help you get up, so you don't fall.      Taking any blood thinning medicines your doctor orders.        Page 1 of 2            [facility]; 3/23   Ways you can help prevent blood clots at home         Wearing compression stockings - if your doctor orders them. ? Walking - to help move the blood in your legs.       Taking any blood thinning medicines your doctor orders.      Signs of a blood clot or PE        Tell your doctor or nurse know right away if you have of the problems listed below.    If you are at home, seek medical care right away. Call 911 for chest pain or problems breathing.          Signs of a blood clot (DVT) - such as pain,  swelling, redness or warmth in your arm or leg      Signs of a pulmonary embolism (PE) - such as chest pain or feeling short of breath

## 2025-02-10 NOTE — H&P (VIEW-ONLY)
CPM/PAT Evaluation       Name: Ludwig Alcazar (Ludwig Alcazar)  /Age: 1955/69 y.o.     Visit Type:   In-Person       Chief Complaint: Closed displaced fracture of proximal phalanx of left ring finger, initial encounter     HPI  Patient is an alert and oriented 69 year old male scheduled for a  PINNING, DIGIT, HAND, PERCUTANEOUS  on 24 with Dr. Stapleton for  Closed displaced fracture of proximal phalanx of left ring finger, initial encounter . PMHX includes CABG x 2 w/aortic valve replacement in . Presents to [facility] PAT today for perioperative risk stratification and optimization.     No past medical history on file.    Past Surgical History:   Procedure Laterality Date    OTHER SURGICAL HISTORY  2020    Heart surgery       Patient Sexual activity questions deferred to the physician.    No family history on file.    Allergies   Allergen Reactions    Statins-Hmg-Coa Reductase Inhibitors Unknown     Muscle issues with Rosuvastatin per pt report       Prior to Admission medications    Not on File         Review of Systems   Constitutional: Negative for chills, decreased appetite, diaphoresis, fever and malaise/fatigue.   Eyes:  Negative for blurred vision and double vision.   Cardiovascular:  Negative for chest pain, claudication, cyanosis, dyspnea on exertion, irregular heartbeat, leg swelling, near-syncope and palpitations.   Respiratory:  Negative for cough, hemoptysis, shortness of breath and wheezing.    Endocrine: Negative for cold intolerance, heat intolerance, polydipsia, polyphagia and polyuria.   Gastrointestinal:  Negative for abdominal pain, constipation, diarrhea, dysphagia, nausea and vomiting.   Genitourinary:  Negative for bladder incontinence, dysuria, hematuria, incomplete emptying, nocturia, frequency, pelvic pain and urgency.   Neurological:  Negative for headaches, light-headedness, paresthesias, sensory change and weakness.   Psychiatric/Behavioral:  Negative for altered  mental status.    Musculoskeletal: Positive for myalgias, arthralgias     Vitals and nursing note reviewed.     Physical exam  Constitutional:       Appearance: Normal appearance. He is Normal.   HENT:      Head: Normocephalic and atraumatic.      Mouth/Throat:      Mouth: Mucous membranes are moist.      Pharynx: Oropharynx is clear.   Eyes:      Extraocular Movements: Extraocular movements intact.      Conjunctiva/sclera: Conjunctivae normal.      Pupils: Pupils are equal, round, and reactive to light.   Cardiovascular:      PMI at left midclavicular line. Normal rate. Regular rhythm. Normal S1. Normal S2.       Murmurs: There is no murmur.      No gallop.  No click. No rub.       No audible carotid bruit     No lower extremity edema on exam  Pulmonary:      Effort: Pulmonary effort is normal.      Breath sounds: Normal breath sounds.   Abdominal:      General: Abdomen is flat. Bowel sounds are normal.      Palpations: Abdomen is soft and non-tender  Musculoskeletal:      Cervical back: Normal range of motion and neck supple.   Skin:     General: Skin is warm and dry.      Capillary Refill: Capillary refill takes less than 2 seconds.   Neurological:      General: No focal deficit present.      Mental Status: He is alert and oriented to person, place, and time. Mental status is at baseline.   Psychiatric:         Mood and Affect: Mood normal.         Behavior: Behavior normal.         Thought Content: Thought content normal.         Judgment: Judgment normal.     Vitals and nursing note reviewed. Physical exam within normal limits.       DASI Risk Score      Flowsheet Row Pre-Admission Testing from 2/10/2025 in [facility]   Can you take care of yourself (eat, dress, bathe, or use toilet)?  2.75 filed at 02/10/2025 0950   Can you walk indoors, such as around your house? 1.75 filed at 02/10/2025 0950   Can you walk a block or two on level ground?  2.75 filed at 02/10/2025 0950   Can you climb a  flight of stairs or walk up a hill? 5.5 filed at 02/10/2025 0950   Can you run a short distance? 8 filed at 02/10/2025 0950   Can you do light work around the house like dusting or washing dishes? 2.7 filed at 02/10/2025 0950   Can you do moderate work around the house like vacuuming, sweeping floors or carrying groceries? 3.5 filed at 02/10/2025 0950   Can you do heavy work around the house like scrubbing floors or lifting and moving heavy furniture?  8 filed at 02/10/2025 0950   Can you do yard work like raking leaves, weeding or pushing a mower? 4.5 filed at 02/10/2025 0950   Can you have sexual relations? 5.25 filed at 02/10/2025 0950   Can you participate in moderate recreational activities like golf, bowling, dancing, doubles tennis or throwing a baseball or football? 6 filed at 02/10/2025 0950   Can you participate in strenous sports like swimming, singles tennis, football, basketball, or skiing? 0 filed at 02/10/2025 0950   DASI SCORE 50.7 filed at 02/10/2025 0950   METS Score (Will be calculated only when all the questions are answered) 9 filed at 02/10/2025 0950          Chris DVT Assessment      Flowsheet Row Pre-Admission Testing from 2/10/2025 in [facility]   DVT Score (IF A SCORE IS NOT CALCULATING, MUST SELECT A BMI TO COMPLETE) 5 filed at 02/10/2025 0948   Surgical Factors Major surgery planned, including arthroscopic and laproscopic (1-2 hours) filed at 02/10/2025 0948   BMI (BMI MUST BE CHOSEN) 30 or less filed at 02/10/2025 0948          Modified Frailty Index    No data to display       CHADS2 Stroke Risk  Current as of 31 minutes ago        N/A 3 to 100%: High Risk   2 to < 3%: Medium Risk   0 to < 2%: Low Risk     Last Change: N/A          This score determines the patient's risk of having a stroke if the patient has atrial fibrillation.        This score is not applicable to this patient. Components are not calculated.          Revised Cardiac Risk Index      Flowsheet Row  Pre-Admission Testing from 2/10/2025 in [facility]   High-Risk Surgery (Intraperitoneal, Intrathoracic,Suprainguinal vascular) 0 filed at 02/10/2025 0950   History of ischemic heart disease (History of MI, History of positive exercuse test, Current chest paint considered due to myocardial ischemia, Use of nitrate therapy, ECG with pathological Q Waves) 0 filed at 02/10/2025 0950   History of congestive heart failure (pulmonary edemia, bilateral rales or S3 gallop, Paroxysmal nocturnal dyspnea, CXR showing pulmonary vascular redistribution) 0 filed at 02/10/2025 0950   History of cerebrovascular disease (Prior TIA or stroke) 0 filed at 02/10/2025 0950   Pre-operative insulin treatment 0 filed at 02/10/2025 0950   Pre-operative creatinine>2 mg/dl 0 filed at 02/10/2025 0950   Revised Cardiac Risk Calculator 0 filed at 02/10/2025 0950          Apfel Simplified Score    No data to display       Risk Analysis Index Results This Encounter    No data found in the last 10 encounters.       Stop Bang Score      Flowsheet Row Pre-Admission Testing from 2/10/2025 in [facility]   Do you snore loudly? 0 filed at 02/10/2025 0911   Do you often feel tired or fatigued after your sleep? 0 filed at 02/10/2025 0911   Has anyone ever observed you stop breathing in your sleep? 0 filed at 02/10/2025 0911   Do you have or are you being treated for high blood pressure? 1 filed at 02/10/2025 0911   Recent BMI (Calculated) 24.9 filed at 02/10/2025 0911   Is BMI greater than 35 kg/m2? 0=No filed at 02/10/2025 0911   Age older than 50 years old? 1=Yes filed at 02/10/2025 0911   Is your neck circumference greater than 17 inches (Male) or 16 inches (Female)? 0 filed at 02/10/2025 0911   Gender - Male 1=Yes filed at 02/10/2025 0911   STOP-BANG Total Score 3 filed at 02/10/2025 0911          Prodigy: High Risk  Total Score: 16              Prodigy Age Score      Prodigy Gender Score          ARISCAT Score for  Postoperative Pulmonary Complications    No data to display       Kait Perioperative Risk for Myocardial Infarction or Cardiac Arrest (JENNIE)      Flowsheet Row Pre-Admission Testing from 2/10/2025 in [facility]   Calculated Age Score 1.38 filed at 02/10/2025 0951   Functional Status  0 filed at 02/10/2025 0951   ASA Class  -3.29 filed at 02/10/2025 0951   Creatinine 0 filed at 02/10/2025 0951   Type of Procedure  0.80 filed at 02/10/2025 0951   JENNIE Total Score  -6.36 filed at 02/10/2025 0951   JENNIE % 0.17 filed at 02/10/2025 0951          Assessment & Plan:    Neuro:  No diagnosis or significant findings on chart review or clinical presentation and evaluation.     HEENT/Airway:  No diagnosis or significant findings on chart review or clinical presentation and evaluation.   STOP-BANG Score-3 points moderaterisk for DEEJAY    Mallampati::  II    TM distance::  >3 FB    Neck ROM::  Full  Dentures-reports both upper and lower  Crowns-denies  Implants-denies    Cardiovascular:  HTN, CABG x 2 (atorvastatin, lisinopril-hydrochlorothiazide, potassium, warfarin)   METS: 9  RCRI: 0 points, 3.9%  risk for postoperative MACE   JENNIE: 0.17% risk for postoperative MACE  EKG -normal sinus rhythm   Clearance received from cardiology - pt to stop coumadin no more than 5 days prior to surgery   Nuclear stress test 4/2021 - per pt coumadin was stopped on 2/7/25  IMPRESSION:  1. No definite evidence of ischemia.  2. In the setting of likely diaphragmatic attenuation, a small basal  inferior wall infarct can not be definitively excluded.  3. Normal left ventricular size.  4. Left ventricular ejection fraction was calculated to be 61% which  is within normal limits.  ECHO 4/2021  CONCLUSIONS:   1. The left ventricular systolic function is normal with a 60-65% estimated ejection fraction.   2. Spectral Doppler shows an impaired relaxation pattern of left ventricular diastolic filling.   3. There is moderate mitral annular  calcification.   4. There is a mechanical aortic valve prosthesis present. Normal function. Mean gradient 8 mm Hg.   5. Mild concentric LVH. Moderate basal septal hypertrophy.   6. Dilated ascending aorta, 4.3 cm.    Pulmonary:  No diagnosis or significant findings on chart review or clinical presentation and evaluation.   ARISCAT: <26 points, 1.6% risk of in-hospital postoperative pulmonary complication  PRODIGY: Low risk for opioid induced respiratory depression  Smoking History- quit smoking >25 years ago   Deep breathing handout given    Renal/Urinary:    BPH (tamsulosin)   however, the patient is at increased risk of perioperative renal complications secondary to HTN. Preventative measures include BP monitoring, medication compliance, and hydration management.   CMP  Creatinine-0.88  GFR->90    Endocrine:  No diagnosis or significant findings on chart review or clinical presentation and evaluation.     Hematologic/Immunology:  No diagnosis or significant findings on chart review or clinical presentation and evaluation.  The patient is not a Jehovah’s witness and will accept blood and blood products if medically indicated.   History of previous blood transfusions No  CBC  H/H 14.7/43.7  Caprini Score 5, patient at Moderate for postoperative DVT. Pt supplied education/VTE handout  Anticoagulation use: Yes     Gastrointestinal:   GERD (pantoprazole)  Recreational drug use:  marijuana gummy at bedtime  Alcohol use 2 liquor drinks per week    Infectious disease:   No diagnosis or significant findings on chart review or clinical presentation and evaluation.     Musculoskeletal:   No diagnosis or significant findings on chart review or clinical presentation and evaluation.   JHFRAT score-4 points. low risk for falls    Anesthesia:  ASA 2 - Patient with mild systemic disease with no functional limitations  Anticipated anesthesia-regional  History of General anesthesia- yes  Complications- No anesthesia  complications  No family history of anesthesia complications    Abnormalities noted on PAT evaluation: No    Labs & Imaging ordered:  Nickel/metal allergy-negative  Shellfish allergy-negative    Discussed with patient medication instructions, NPO guidelines, and any questions or concerns.      time spent  35

## 2025-02-12 ENCOUNTER — HOSPITAL ENCOUNTER (OUTPATIENT)
Facility: HOSPITAL | Age: 70
Setting detail: OUTPATIENT SURGERY
Discharge: HOME | End: 2025-02-12
Attending: ORTHOPAEDIC SURGERY | Admitting: ORTHOPAEDIC SURGERY
Payer: COMMERCIAL

## 2025-02-12 ENCOUNTER — ANESTHESIA EVENT (OUTPATIENT)
Dept: OPERATING ROOM | Facility: HOSPITAL | Age: 70
End: 2025-02-12
Payer: COMMERCIAL

## 2025-02-12 ENCOUNTER — APPOINTMENT (OUTPATIENT)
Dept: RADIOLOGY | Facility: HOSPITAL | Age: 70
End: 2025-02-12
Payer: COMMERCIAL

## 2025-02-12 ENCOUNTER — ANESTHESIA (OUTPATIENT)
Dept: OPERATING ROOM | Facility: HOSPITAL | Age: 70
End: 2025-02-12
Payer: COMMERCIAL

## 2025-02-12 VITALS
HEIGHT: 70 IN | DIASTOLIC BLOOD PRESSURE: 88 MMHG | WEIGHT: 171.74 LBS | TEMPERATURE: 97.7 F | SYSTOLIC BLOOD PRESSURE: 138 MMHG | RESPIRATION RATE: 18 BRPM | HEART RATE: 65 BPM | BODY MASS INDEX: 24.59 KG/M2 | OXYGEN SATURATION: 98 %

## 2025-02-12 DIAGNOSIS — S62.615A CLOSED DISPLACED FRACTURE OF PROXIMAL PHALANX OF LEFT RING FINGER, INITIAL ENCOUNTER: ICD-10-CM

## 2025-02-12 DIAGNOSIS — S62.615A DISPLACED FRACTURE OF PROXIMAL PHALANX OF LEFT RING FINGER, INITIAL ENCOUNTER FOR CLOSED FRACTURE: Primary | ICD-10-CM

## 2025-02-12 PROBLEM — M10.9 GOUT: Status: ACTIVE | Noted: 2025-02-12

## 2025-02-12 PROBLEM — E78.5 HYPERLIPIDEMIA: Status: ACTIVE | Noted: 2025-02-12

## 2025-02-12 PROBLEM — I25.10 CAD (CORONARY ARTERY DISEASE): Status: ACTIVE | Noted: 2025-02-12

## 2025-02-12 PROBLEM — I10 HTN (HYPERTENSION): Status: ACTIVE | Noted: 2025-02-12

## 2025-02-12 PROBLEM — K44.9 HIATAL HERNIA: Status: ACTIVE | Noted: 2025-02-12

## 2025-02-12 PROBLEM — Z95.1 HISTORY OF CORONARY ARTERY BYPASS GRAFT: Status: ACTIVE | Noted: 2025-02-12

## 2025-02-12 PROBLEM — Z79.01 ANTICOAGULANT LONG-TERM USE: Status: ACTIVE | Noted: 2025-02-12

## 2025-02-12 PROCEDURE — 7100000002 HC RECOVERY ROOM TIME - EACH INCREMENTAL 1 MINUTE: Performed by: ORTHOPAEDIC SURGERY

## 2025-02-12 PROCEDURE — 7100000009 HC PHASE TWO TIME - INITIAL BASE CHARGE: Performed by: ORTHOPAEDIC SURGERY

## 2025-02-12 PROCEDURE — 3600000003 HC OR TIME - INITIAL BASE CHARGE - PROCEDURE LEVEL THREE: Performed by: ORTHOPAEDIC SURGERY

## 2025-02-12 PROCEDURE — A26727 PR PERCUT RX PROX/MID FING SHFT FX: Performed by: STUDENT IN AN ORGANIZED HEALTH CARE EDUCATION/TRAINING PROGRAM

## 2025-02-12 PROCEDURE — 3600000008 HC OR TIME - EACH INCREMENTAL 1 MINUTE - PROCEDURE LEVEL THREE: Performed by: ORTHOPAEDIC SURGERY

## 2025-02-12 PROCEDURE — 3700000001 HC GENERAL ANESTHESIA TIME - INITIAL BASE CHARGE: Performed by: ORTHOPAEDIC SURGERY

## 2025-02-12 PROCEDURE — 7100000001 HC RECOVERY ROOM TIME - INITIAL BASE CHARGE: Performed by: ORTHOPAEDIC SURGERY

## 2025-02-12 PROCEDURE — 2500000004 HC RX 250 GENERAL PHARMACY W/ HCPCS (ALT 636 FOR OP/ED): Performed by: NURSE ANESTHETIST, CERTIFIED REGISTERED

## 2025-02-12 PROCEDURE — A26727 PR PERCUT RX PROX/MID FING SHFT FX: Performed by: NURSE ANESTHETIST, CERTIFIED REGISTERED

## 2025-02-12 PROCEDURE — 2500000004 HC RX 250 GENERAL PHARMACY W/ HCPCS (ALT 636 FOR OP/ED): Performed by: PHYSICIAN ASSISTANT

## 2025-02-12 PROCEDURE — 2500000004 HC RX 250 GENERAL PHARMACY W/ HCPCS (ALT 636 FOR OP/ED): Performed by: ORTHOPAEDIC SURGERY

## 2025-02-12 PROCEDURE — 3700000002 HC GENERAL ANESTHESIA TIME - EACH INCREMENTAL 1 MINUTE: Performed by: ORTHOPAEDIC SURGERY

## 2025-02-12 PROCEDURE — 7100000010 HC PHASE TWO TIME - EACH INCREMENTAL 1 MINUTE: Performed by: ORTHOPAEDIC SURGERY

## 2025-02-12 PROCEDURE — 26727 TREAT FINGER FRACTURE EACH: CPT | Performed by: ORTHOPAEDIC SURGERY

## 2025-02-12 RX ORDER — LIDOCAINE HYDROCHLORIDE 10 MG/ML
INJECTION, SOLUTION INFILTRATION; PERINEURAL AS NEEDED
Status: DISCONTINUED | OUTPATIENT
Start: 2025-02-12 | End: 2025-02-12

## 2025-02-12 RX ORDER — MIDAZOLAM HYDROCHLORIDE 1 MG/ML
INJECTION, SOLUTION INTRAMUSCULAR; INTRAVENOUS AS NEEDED
Status: DISCONTINUED | OUTPATIENT
Start: 2025-02-12 | End: 2025-02-12

## 2025-02-12 RX ORDER — OXYCODONE AND ACETAMINOPHEN 5; 325 MG/1; MG/1
1 TABLET ORAL EVERY 4 HOURS PRN
Status: DISCONTINUED | OUTPATIENT
Start: 2025-02-12 | End: 2025-02-12 | Stop reason: HOSPADM

## 2025-02-12 RX ORDER — BUPIVACAINE HYDROCHLORIDE 5 MG/ML
INJECTION, SOLUTION PERINEURAL AS NEEDED
Status: DISCONTINUED | OUTPATIENT
Start: 2025-02-12 | End: 2025-02-12 | Stop reason: HOSPADM

## 2025-02-12 RX ORDER — PROPOFOL 10 MG/ML
INJECTION, EMULSION INTRAVENOUS AS NEEDED
Status: DISCONTINUED | OUTPATIENT
Start: 2025-02-12 | End: 2025-02-12

## 2025-02-12 RX ORDER — DOXYCYCLINE 100 MG/1
100 CAPSULE ORAL 2 TIMES DAILY
Qty: 14 CAPSULE | Refills: 0 | Status: SHIPPED | OUTPATIENT
Start: 2025-02-12 | End: 2025-02-19

## 2025-02-12 RX ORDER — ONDANSETRON HYDROCHLORIDE 2 MG/ML
4 INJECTION, SOLUTION INTRAVENOUS ONCE AS NEEDED
Status: DISCONTINUED | OUTPATIENT
Start: 2025-02-12 | End: 2025-02-12 | Stop reason: HOSPADM

## 2025-02-12 RX ORDER — LIDOCAINE HYDROCHLORIDE 10 MG/ML
INJECTION, SOLUTION INFILTRATION; PERINEURAL AS NEEDED
Status: DISCONTINUED | OUTPATIENT
Start: 2025-02-12 | End: 2025-02-12 | Stop reason: HOSPADM

## 2025-02-12 RX ORDER — ONDANSETRON HYDROCHLORIDE 2 MG/ML
INJECTION, SOLUTION INTRAVENOUS AS NEEDED
Status: DISCONTINUED | OUTPATIENT
Start: 2025-02-12 | End: 2025-02-12

## 2025-02-12 RX ORDER — FENTANYL CITRATE 50 UG/ML
INJECTION, SOLUTION INTRAMUSCULAR; INTRAVENOUS AS NEEDED
Status: DISCONTINUED | OUTPATIENT
Start: 2025-02-12 | End: 2025-02-12

## 2025-02-12 RX ORDER — DROPERIDOL 2.5 MG/ML
0.62 INJECTION, SOLUTION INTRAMUSCULAR; INTRAVENOUS ONCE AS NEEDED
Status: DISCONTINUED | OUTPATIENT
Start: 2025-02-12 | End: 2025-02-12 | Stop reason: HOSPADM

## 2025-02-12 RX ORDER — FENTANYL CITRATE 50 UG/ML
12.5 INJECTION, SOLUTION INTRAMUSCULAR; INTRAVENOUS EVERY 5 MIN PRN
Status: DISCONTINUED | OUTPATIENT
Start: 2025-02-12 | End: 2025-02-12 | Stop reason: HOSPADM

## 2025-02-12 RX ORDER — ROCURONIUM BROMIDE 10 MG/ML
INJECTION, SOLUTION INTRAVENOUS AS NEEDED
Status: DISCONTINUED | OUTPATIENT
Start: 2025-02-12 | End: 2025-02-12

## 2025-02-12 RX ORDER — ALBUTEROL SULFATE 0.83 MG/ML
2.5 SOLUTION RESPIRATORY (INHALATION) ONCE AS NEEDED
Status: DISCONTINUED | OUTPATIENT
Start: 2025-02-12 | End: 2025-02-12 | Stop reason: HOSPADM

## 2025-02-12 RX ORDER — CEFAZOLIN SODIUM 2 G/100ML
2 INJECTION, SOLUTION INTRAVENOUS ONCE
Status: COMPLETED | OUTPATIENT
Start: 2025-02-12 | End: 2025-02-12

## 2025-02-12 RX ORDER — SODIUM CHLORIDE, SODIUM LACTATE, POTASSIUM CHLORIDE, CALCIUM CHLORIDE 600; 310; 30; 20 MG/100ML; MG/100ML; MG/100ML; MG/100ML
50 INJECTION, SOLUTION INTRAVENOUS CONTINUOUS
Status: DISCONTINUED | OUTPATIENT
Start: 2025-02-12 | End: 2025-02-12 | Stop reason: HOSPADM

## 2025-02-12 RX ORDER — HYDROCODONE BITARTRATE AND ACETAMINOPHEN 5; 325 MG/1; MG/1
1 TABLET ORAL EVERY 8 HOURS PRN
Qty: 15 TABLET | Refills: 0 | Status: SHIPPED | OUTPATIENT
Start: 2025-02-12 | End: 2025-02-17

## 2025-02-12 RX ORDER — LIDOCAINE HYDROCHLORIDE 10 MG/ML
0.1 INJECTION, SOLUTION EPIDURAL; INFILTRATION; INTRACAUDAL; PERINEURAL ONCE
Status: DISCONTINUED | OUTPATIENT
Start: 2025-02-12 | End: 2025-02-12 | Stop reason: HOSPADM

## 2025-02-12 RX ORDER — FENTANYL CITRATE 50 UG/ML
25 INJECTION, SOLUTION INTRAMUSCULAR; INTRAVENOUS EVERY 5 MIN PRN
Status: DISCONTINUED | OUTPATIENT
Start: 2025-02-12 | End: 2025-02-12 | Stop reason: HOSPADM

## 2025-02-12 RX ORDER — LABETALOL HYDROCHLORIDE 5 MG/ML
5 INJECTION, SOLUTION INTRAVENOUS ONCE AS NEEDED
Status: DISCONTINUED | OUTPATIENT
Start: 2025-02-12 | End: 2025-02-12 | Stop reason: HOSPADM

## 2025-02-12 RX ORDER — FENTANYL CITRATE 50 UG/ML
50 INJECTION, SOLUTION INTRAMUSCULAR; INTRAVENOUS EVERY 5 MIN PRN
Status: DISCONTINUED | OUTPATIENT
Start: 2025-02-12 | End: 2025-02-12 | Stop reason: HOSPADM

## 2025-02-12 RX ADMIN — DEXAMETHASONE SODIUM PHOSPHATE 8 MG: 4 INJECTION, SOLUTION INTRAMUSCULAR; INTRAVENOUS at 10:15

## 2025-02-12 RX ADMIN — PROPOFOL 200 MG: 10 INJECTION, EMULSION INTRAVENOUS at 10:09

## 2025-02-12 RX ADMIN — FENTANYL CITRATE 50 MCG: 50 INJECTION INTRAMUSCULAR; INTRAVENOUS at 10:16

## 2025-02-12 RX ADMIN — SODIUM CHLORIDE, POTASSIUM CHLORIDE, SODIUM LACTATE AND CALCIUM CHLORIDE: 600; 310; 30; 20 INJECTION, SOLUTION INTRAVENOUS at 10:04

## 2025-02-12 RX ADMIN — MIDAZOLAM 2 MG: 1 INJECTION INTRAMUSCULAR; INTRAVENOUS at 10:02

## 2025-02-12 RX ADMIN — LIDOCAINE HYDROCHLORIDE 50 MG: 10 INJECTION, SOLUTION INFILTRATION; PERINEURAL at 10:08

## 2025-02-12 RX ADMIN — CEFAZOLIN SODIUM 2 G: 2 INJECTION, SOLUTION INTRAVENOUS at 10:14

## 2025-02-12 RX ADMIN — ROCURONIUM BROMIDE 50 MG: 10 INJECTION INTRAVENOUS at 10:10

## 2025-02-12 RX ADMIN — ONDANSETRON 4 MG: 2 INJECTION, SOLUTION INTRAMUSCULAR; INTRAVENOUS at 10:15

## 2025-02-12 RX ADMIN — SUGAMMADEX 200 MG: 100 INJECTION, SOLUTION INTRAVENOUS at 10:32

## 2025-02-12 RX ADMIN — FENTANYL CITRATE 50 MCG: 50 INJECTION INTRAMUSCULAR; INTRAVENOUS at 10:08

## 2025-02-12 ASSESSMENT — PAIN SCALES - GENERAL
PAINLEVEL_OUTOF10: 0 - NO PAIN

## 2025-02-12 ASSESSMENT — COLUMBIA-SUICIDE SEVERITY RATING SCALE - C-SSRS
1. IN THE PAST MONTH, HAVE YOU WISHED YOU WERE DEAD OR WISHED YOU COULD GO TO SLEEP AND NOT WAKE UP?: NO
2. HAVE YOU ACTUALLY HAD ANY THOUGHTS OF KILLING YOURSELF?: NO

## 2025-02-12 ASSESSMENT — PAIN - FUNCTIONAL ASSESSMENT
PAIN_FUNCTIONAL_ASSESSMENT: 0-10

## 2025-02-12 NOTE — ANESTHESIA POSTPROCEDURE EVALUATION
Patient: Ludwig Alcazar    Procedure Summary       Date: 02/12/25 Room / Location: [facility] / [facility]    Anesthesia Start: 1004 Anesthesia Stop: 1041    Procedure: PINNING, DIGIT, HAND, PERCUTANEOUS ( Osteomed canulated screw full set and K-wires , Mini C- Arm ) (Left: Hand) Diagnosis:       Closed displaced fracture of proximal phalanx of left ring finger, initial encounter      (Closed displaced fracture of proximal phalanx of left ring finger, initial encounter [F06.199R])    Surgeons: Igor Stapleton MD Responsible Provider: Bubba Olmedo MD    Anesthesia Type: MAC ASA Status: 3            Anesthesia Type: MAC    Vitals Value Taken Time   /71 02/12/25 1050   Temp 35.9 °C (96.6 °F) 02/12/25 1039   Pulse 66 02/12/25 1050   Resp 17 02/12/25 1039   SpO2 95 % 02/12/25 1050       Anesthesia Post Evaluation    Patient location during evaluation: PACU  Patient participation: complete - patient participated  Level of consciousness: awake and alert  Pain management: adequate  Multimodal analgesia pain management approach  Airway patency: patent  Cardiovascular status: acceptable  Respiratory status: acceptable  Hydration status: acceptable  Postoperative Nausea and Vomiting: none        There were no known notable events for this encounter.

## 2025-02-12 NOTE — ANESTHESIA PREPROCEDURE EVALUATION
Patient: Ludwig Alcazar    Procedure Information       Date/Time: 02/12/25 0930    Procedure: PINNING, DIGIT, HAND, PERCUTANEOUS ( Osteomed canulated screw full set and K-wires , Mini C- Arm ) (Left: Hand)    Location: [facility] / [facility]    Surgeons: Igor Stapleton MD            Relevant Problems   Cardiac  S/p CABG, AVR (2011)   (+) CAD (coronary artery disease)   (+) HTN (hypertension)   (+) History of coronary artery bypass graft   (+) Hyperlipidemia      GI   (+) Hiatal hernia      Hematology   (+) Anticoagulant long-term use      Musculoskeletal   (+) Closed displaced fracture of proximal phalanx of left ring finger      Musculoskeletal and Injuries   (+) Gout       Clinical information reviewed:   Tobacco  Allergies  Meds   Med Hx  Surg Hx   Fam Hx  Soc Hx        NPO Detail:  NPO/Void Status  Date of Last Liquid: 02/12/25  Time of Last Liquid: 0630  Date of Last Solid: 02/11/25  Time of Last Solid: 2000  Last Intake Type: Clear fluids  Time of Last Void: 0700         Physical Exam    Airway  Mallampati: II  TM distance: >3 FB  Neck ROM: full     Cardiovascular - normal exam     Dental - normal exam     Pulmonary - normal exam     Abdominal - normal exam             Anesthesia Plan    History of general anesthesia?: yes  History of complications of general anesthesia?: no    ASA 3     general     intravenous induction   Postoperative administration of opioids is intended.  Trial extubation is planned.  Anesthetic plan and risks discussed with patient.  Use of blood products discussed with patient who consented to blood products.    Plan discussed with CRNA.

## 2025-02-12 NOTE — DISCHARGE INSTRUCTIONS
You had hand surgery today.  Local anesthesia was used during the procedure and you may have some numbness in the region for a few hours postoperatively.    Please leave the splint intact after surgery, if it gets too tight you may loosen the Ace wrap and cotton underneath but do not remove fully.    It is important to elevate the hand for the next 3 to 5 days, and move your other fingers fully.  It is very normal to have some bruising in the region and it may travel down the forearm.    If needed, ok to take the prescription pain medication sent in as well as the antibiotic, or if pain is not severe, you can just take tylenol for pain every 6-8 hours as needed    Please call the office for a postop appointment in 10 to 14 days after surgery.

## 2025-02-12 NOTE — ANESTHESIA PROCEDURE NOTES
Airway  Date/Time: 2/12/2025 10:13 AM  Urgency: elective    Airway not difficult    Staffing  Performed: SRNA   Authorized by: Bubba Olmedo MD    Performed by: ROBERT Huizar  Patient location during procedure: OR    Indications and Patient Condition  Indications for airway management: anesthesia  Spontaneous ventilation: present  Sedation level: deep  Preoxygenated: yes  Patient position: sniffing  Mask difficulty assessment: 2 - vent by mask + OA or adjuvant +/- NMBA    Final Airway Details  Final airway type: endotracheal airway      Successful airway: ETT  Cuffed: yes   Successful intubation technique: direct laryngoscopy  Facilitating devices/methods: intubating stylet and anterior pressure/BURP  Endotracheal tube insertion site: oral  Blade: Joana  Blade size: #4  ETT size (mm): 7.0  Cormack-Lehane Classification: grade IIb - view of arytenoids or posterior of glottis only  Placement verified by: chest auscultation and capnometry   Cuff volume (mL): 7  Measured from: lips  ETT to lips (cm): 23  Number of attempts at approach: 1

## 2025-02-12 NOTE — PERIOPERATIVE NURSING NOTE
Uneventful recovery, no interventions needed in PACU. Pt meets DC criteria, removed from monitors. Pt safety maintained, tolerating PO.

## 2025-02-12 NOTE — OP NOTE
PINNING, DIGIT, HAND, PERCUTANEOUS ( Osteomed canulated screw full set and K-wires , Mini C- Arm ) (L) Operative Note     Date: 2025  OR Location: [facility]    Name: Ludwig Alcazar, : 1955, Age: 69 y.o., MRN: [id number], Sex: male    Diagnosis  Pre-op Diagnosis      * Closed displaced fracture of proximal phalanx of left ring finger, initial encounter [L99.680Z] Post-op Diagnosis     * Closed displaced fracture of proximal phalanx of left ring finger, initial encounter [C02.721S]     Procedures  PINNING, DIGIT, HAND, PERCUTANEOUS ( Osteomed canulated screw full set and K-wires , Mini C- Arm )  51384 - WA PRQ SKEL FIXJ PHLNGL SHFT FX PROX/MIDDLE PX/F/T    Percutaneous pin fixation left finger displaced proximal phalangeal fracture  Surgeons      * Igor Stapleton - Primary    Resident/Fellow/Other Assistant:  Surgeons and Role:  * No surgeons found with a matching role *    Staff:   Circulator: Kim Pitts Person: Sunita Pitts Person: aSlma    Anesthesia Staff: Anesthesiologist: Bubba Olmedo MD  CRNA: JODIE Huizar-CRNA    Procedure Summary  Anesthesia: Monitor Anesthesia Care  ASA: III  Estimated Blood Loss: 1mL  Intra-op Medications:   Administrations occurring from 0930 to 1020 on 25:   Medication Name Total Dose   BUPivacaine HCl (Marcaine) 0.5 % (5 mg/mL) injection 7 mL   lidocaine (Xylocaine) 10 mg/mL (1 %) injection 7 mL   ceFAZolin (Ancef) 2 g in dextrose (iso)  mL 2 g   dexAMETHasone (Decadron) injection 4 mg/mL 8 mg   fentaNYL (Sublimaze) injection 50 mcg/mL 100 mcg   LR bolus Cannot be calculated   lidocaine (Xylocaine) 1 % 50 mg   midazolam (Versed) injection 1 mg/mL 2 mg   ondansetron (Zofran) 2 mg/mL injection 4 mg   propofol (Diprivan) injection 10 mg/mL 200 mg   rocuronium (ZeMuron) 50 mg/5 mL injection 50 mg              Anesthesia Record               Intraprocedure I/O Totals          Intake    LR bolus 300.00 mL    ceFAZolin (Ancef) 2 g in dextrose (iso)   mL 100.00 mL    Total Intake 400 mL          Specimen: No specimens collected              Drains and/or Catheters: * None in log *    Tourniquet Times:   * Missing tourniquet times found for documented tourniquets in lo *     Implants:     Findings: Malrotated digit    Indications: Ludwig Alcazar is an 69 y.o. male who is having surgery for Closed displaced fracture of proximal phalanx of left ring finger, initial encounter [O74.957B].  Pleasant gentleman comes in today for finger pain he understands even with surgery there is risk of nerve artery tendon damage infection stiffness malrotation need for future surgery infection we will have to remove the pins.  Wish to proceed informed consent obtained    The patient was seen in the preoperative area. The risks, benefits, complications, treatment options, non-operative alternatives, expected recovery and outcomes were discussed with the patient. The possibilities of reaction to medication, pulmonary aspiration, injury to surrounding structures, bleeding, recurrent infection, the need for additional procedures, failure to diagnose a condition, and creating a complication requiring transfusion or operation were discussed with the patient. The patient concurred with the proposed plan, giving informed consent.  The site of surgery was properly noted/marked if necessary per policy. The patient has been actively warmed in preoperative area. Preoperative antibiotics have been ordered and given within 1 hours of incision. Venous thrombosis prophylaxis are not indicated.    Procedure Details: [place] patient brought the operating room after sterilely prepping draping form a timeout we did a digital block and then were able to do traction flexion and correct rotation placed 3, .045 K wires across the fracture site using C arm guidance pins were cut flush with the skin final x-rays showed excellent alignment of the mini C arm in both the AP and lateral planes  bulky dressing placed also in correct rotation with a volar splint plated no complications we will follow-up in 2 weeks and leave the splint on.  Complications:  None; patient tolerated the procedure well.    Disposition: PACU - hemodynamically stable.  Condition: stable                 Additional Details: 0    Attending Attestation: I performed the procedure.    Igor Stapleton  Phone Number: [phone]

## 2025-02-13 ASSESSMENT — PAIN SCALES - GENERAL: PAINLEVEL_OUTOF10: 0 - NO PAIN

## 2025-02-27 ENCOUNTER — HOSPITAL ENCOUNTER (OUTPATIENT)
Dept: RADIOLOGY | Facility: HOSPITAL | Age: 70
Discharge: HOME | End: 2025-02-27
Payer: COMMERCIAL

## 2025-02-27 ENCOUNTER — OFFICE VISIT (OUTPATIENT)
Dept: ORTHOPEDIC SURGERY | Facility: HOSPITAL | Age: 70
End: 2025-02-27
Payer: COMMERCIAL

## 2025-02-27 DIAGNOSIS — S62.615A DISPLACED FRACTURE OF PROXIMAL PHALANX OF LEFT RING FINGER, INITIAL ENCOUNTER FOR CLOSED FRACTURE: ICD-10-CM

## 2025-02-27 DIAGNOSIS — S62.615A DISPLACED FRACTURE OF PROXIMAL PHALANX OF LEFT RING FINGER, INITIAL ENCOUNTER FOR CLOSED FRACTURE: Primary | ICD-10-CM

## 2025-02-27 DIAGNOSIS — S62.615A CLOSED DISPLACED FRACTURE OF PROXIMAL PHALANX OF LEFT RING FINGER, INITIAL ENCOUNTER: ICD-10-CM

## 2025-02-27 PROCEDURE — 99211 OFF/OP EST MAY X REQ PHY/QHP: CPT | Performed by: ORTHOPAEDIC SURGERY

## 2025-02-27 PROCEDURE — 73130 X-RAY EXAM OF HAND: CPT | Mod: LT

## 2025-02-27 PROCEDURE — 1159F MED LIST DOCD IN RCRD: CPT | Performed by: ORTHOPAEDIC SURGERY

## 2025-02-27 PROCEDURE — 1036F TOBACCO NON-USER: CPT | Performed by: ORTHOPAEDIC SURGERY

## 2025-02-27 PROCEDURE — 1160F RVW MEDS BY RX/DR IN RCRD: CPT | Performed by: ORTHOPAEDIC SURGERY

## 2025-02-27 PROCEDURE — 1125F AMNT PAIN NOTED PAIN PRSNT: CPT | Performed by: ORTHOPAEDIC SURGERY

## 2025-02-27 ASSESSMENT — PAIN SCALES - GENERAL: PAINLEVEL_OUTOF10: 1

## 2025-02-27 ASSESSMENT — PAIN - FUNCTIONAL ASSESSMENT: PAIN_FUNCTIONAL_ASSESSMENT: 0-10

## 2025-02-27 NOTE — PROGRESS NOTES
Reason for Appointment  No chief complaint on file.    History of Present Illness  Pleasant gentleman comes in today and doing well status post proximal phalangeal left ring finger pinning we removed 1 pin today but the other 2 pins are buried and we will obtain consent for removal of superficial pins in 1 week the pin sites look good also overall alignment is good x-rays look excellent    Assessment   Encounter Diagnoses   Name Primary?    Displaced fracture of proximal phalanx of left ring finger, initial encounter for closed fracture Yes    Closed displaced fracture of proximal phalanx of left ring finger, initial encounter    I, Alyssa Ahumada, attest that this documentation has been prepared under the direction and in the presence of Igor Stapleton MD.   By signing below, I, Igor Stapleton MD, personally performed the services described in this documentation. All medical record entries made by the scribe were at my direction and in my presence. I have reviewed the chart and agree that the record reflects my personal performance and is accurate and complete.

## 2025-02-28 ENCOUNTER — CLINICAL SUPPORT (OUTPATIENT)
Dept: PREADMISSION TESTING | Facility: HOSPITAL | Age: 70
End: 2025-02-28
Payer: COMMERCIAL

## 2025-02-28 VITALS — WEIGHT: 170 LBS | HEIGHT: 70 IN | BODY MASS INDEX: 24.34 KG/M2

## 2025-02-28 RX ORDER — METOPROLOL SUCCINATE 200 MG/1
100 TABLET, EXTENDED RELEASE ORAL 2 TIMES DAILY
COMMUNITY

## 2025-02-28 ASSESSMENT — ENCOUNTER SYMPTOMS
WOUND: 0
SHORTNESS OF BREATH: 0
ABDOMINAL PAIN: 0
FEVER: 0
DYSPNEA WITH EXERTION: 0
CHILLS: 0
DYSPNEA AT REST: 0

## 2025-02-28 NOTE — PERIOPERATIVE NURSING NOTE
Spoke with patient and completed RN PAT screening call. Discussed PAT education. Chart updated per information provided by patient. Patient had no further questions at this time. In person PAT appointment is scheduled for patient. Patient aware. Discussed last dose of Coumadin 2/27/2025 per DR Sourav Norman note that patient may hold for 5 days prior.

## 2025-02-28 NOTE — CPM/PAT H&P
CPM/PAT Evaluation       Name: Ludwig Alcazar (Ludwig Alcazar)  /Age: 1955/69 y.o.     { PAT Visit Type:05979}      Chief Complaint: ***    HPI    Past Medical History:   Diagnosis Date    Arthritis     BPH (benign prostatic hyperplasia)     Coronary artery disease     GERD (gastroesophageal reflux disease)     GI (gastrointestinal bleed)     Heart valve disease     Hiatal hernia     Hyperlipidemia     Hypertension     Vision loss        Past Surgical History:   Procedure Laterality Date    AORTIC VALVE REPLACEMENT      mechanical     COLONOSCOPY      FINGER SURGERY Right     middle, surgery to repair fracture    FINGER SURGERY Left     ring finger. pinning    OTHER SURGICAL HISTORY  2020    Heart surgery, CABG    PROSTATE BIOPSY      STOMACH SURGERY      for GI bleed, clip       Patient Sexual activity questions deferred to the physician.    Family History   Problem Relation Name Age of Onset    Benign prostatic hyperplasia Father      Heart disease Father      Heart attack Father         Allergies   Allergen Reactions    Statins-Hmg-Coa Reductase Inhibitors Unknown     Muscle issues with Rosuvastatin per pt report       Prior to Admission medications    Medication Sig Start Date End Date Taking? Authorizing Provider   atorvastatin (Lipitor) 80 mg tablet Take 1 tablet (80 mg) by mouth at 3:00 in the morning.   Yes Historical Provider, MD   cyanocobalamin (Vitamin B-12) 1,000 mcg tablet Take 0.5 tablets (500 mcg) by mouth once daily in the morning.   Yes Historical Provider, MD   lisinopriL-hydrochlorothiazide 20-12.5 mg tablet Take 0.5 tablets by mouth 2 times a day. 25  Yes Historical Provider, MD   MAGNESIUM ORAL Take 500 mg by mouth once daily in the evening. Take with meals.   Yes Historical Provider, MD   metoprolol succinate XL (Toprol-XL) 200 mg 24 hr tablet Take 0.5 tablets (100 mg) by mouth twice a day.   Yes Historical Provider, MD   pantoprazole (ProtoNix) 40 mg EC tablet  "Take 1 tablet (40 mg) by mouth once daily in the morning. Take before meals. 8/5/20  Yes Historical Provider, MD   potassium chloride CR 20 mEq ER tablet Take 1 tablet (20 mEq) by mouth once daily in the evening. Take with meals.   Yes Historical Provider, MD   tamsulosin (Flomax) 0.4 mg 24 hr capsule Take 1 capsule (0.4 mg) by mouth at 3:00 in the morning.   Yes Historical Provider, MD   warfarin (Coumadin) 7.5 mg tablet Take 1 tablet (7.5 mg) by mouth once daily in the evening.   Yes Historical Provider, MD        PAT ROS:   Constitutional:    no fever   no chills  Neuro/Psych:   Eyes:   Ears:   Nose:   Mouth:   Throat:    no throat pain  Neck:   Cardio:    no chest pain   no dyspnea   no ALVAREZ  Respiratory:    no shortness of breath  Endocrine:   GI:    no abdominal pain  :   Musculoskeletal:   Hematologic:    no history of blood transfusion  Skin:   no sores/wound      PAT Physical Exam     Airway    Testing/Diagnostic:     Patient Specialist/PCP:     Visit Vitals  Ht 1.778 m (5' 10\") Comment: stated   Wt 77.1 kg (170 lb) Comment: stated   BMI 24.39 kg/m²   Smoking Status Former   BSA 1.95 m²       DASI Risk Score      Flowsheet Row Pre-Admission Testing from 2/10/2025 in [facility]   Can you take care of yourself (eat, dress, bathe, or use toilet)?  2.75 filed at 02/10/2025 0950   Can you walk indoors, such as around your house? 1.75 filed at 02/10/2025 0950   Can you walk a block or two on level ground?  2.75 filed at 02/10/2025 0950   Can you climb a flight of stairs or walk up a hill? 5.5 filed at 02/10/2025 0950   Can you run a short distance? 8 filed at 02/10/2025 0950   Can you do light work around the house like dusting or washing dishes? 2.7 filed at 02/10/2025 0950   Can you do moderate work around the house like vacuuming, sweeping floors or carrying groceries? 3.5 filed at 02/10/2025 0950   Can you do heavy work around the house like scrubbing floors or lifting and moving heavy " furniture?  8 filed at 02/10/2025 0950   Can you do yard work like raking leaves, weeding or pushing a mower? 4.5 filed at 02/10/2025 0950   Can you have sexual relations? 5.25 filed at 02/10/2025 0950   Can you participate in moderate recreational activities like golf, bowling, dancing, doubles tennis or throwing a baseball or football? 6 filed at 02/10/2025 0950   Can you participate in strenous sports like swimming, singles tennis, football, basketball, or skiing? 0 filed at 02/10/2025 0950   DASI SCORE 50.7 filed at 02/10/2025 0950   METS Score (Will be calculated only when all the questions are answered) 9 filed at 02/10/2025 0950          Caprini DVT Assessment      Flowsheet Row Pre-Admission Testing from 2/10/2025 in [facility]   DVT Score (IF A SCORE IS NOT CALCULATING, MUST SELECT A BMI TO COMPLETE) 5 filed at 02/10/2025 0948   Surgical Factors Major surgery planned, including arthroscopic and laproscopic (1-2 hours) filed at 02/10/2025 0948   BMI (BMI MUST BE CHOSEN) 30 or less filed at 02/10/2025 0948          Modified Frailty Index    No data to display       [id number] Stroke Risk Points  Current as of just now        N/A 0 to 9 Points:      Last Change: N/A          The [id number] risk score (Lip CONSUELO, et al. 2009. © 2010 [employer]) quantifies the risk of stroke for a patient with atrial fibrillation. For patients without atrial fibrillation or under the age of 18 this score appears as N/A. Higher score values generally indicate higher risk of stroke.        This score is not applicable to this patient. Components are not calculated.          Revised Cardiac Risk Index      Flowsheet Row Pre-Admission Testing from 2/10/2025 in [facility]   High-Risk Surgery (Intraperitoneal, Intrathoracic,Suprainguinal vascular) 0 filed at 02/10/2025 0950   History of ischemic heart disease (History of MI, History of positive exercuse test, Current chest  paint considered due to myocardial ischemia, Use of nitrate therapy, ECG with pathological Q Waves) 0 filed at 02/10/2025 0950   History of congestive heart failure (pulmonary edemia, bilateral rales or S3 gallop, Paroxysmal nocturnal dyspnea, CXR showing pulmonary vascular redistribution) 0 filed at 02/10/2025 0950   History of cerebrovascular disease (Prior TIA or stroke) 0 filed at 02/10/2025 0950   Pre-operative insulin treatment 0 filed at 02/10/2025 0950   Pre-operative creatinine>2 mg/dl 0 filed at 02/10/2025 0950   Revised Cardiac Risk Calculator 0 filed at 02/10/2025 0950          Apfel Simplified Score    No data to display       Risk Analysis Index Results This Encounter    No data found in the last 10 encounters.       Stop Bang Score      Flowsheet Row Clinical Support from 2/28/2025 in [facility] Admission (Discharged) from 2/12/2025 in [facility] OR with Igor Stapleton MD   Do you snore loudly? 0 filed at 02/28/2025 1111 0 filed at 02/12/2025 0847   Do you often feel tired or fatigued after your sleep? 0 filed at 02/28/2025 1111 0 filed at 02/12/2025 0847   Has anyone ever observed you stop breathing in your sleep? 0 filed at 02/28/2025 1111 0 filed at 02/12/2025 0847   Do you have or are you being treated for high blood pressure? 1 filed at 02/28/2025 1111 1 filed at 02/12/2025 0847   Recent BMI (Calculated) 24.4 filed at 02/28/2025 1111 24.9 filed at 02/12/2025 0847   Is BMI greater than 35 kg/m2? 0=No filed at 02/28/2025 1111 0=No filed at 02/12/2025 0847   Age older than 50 years old? 1=Yes filed at 02/28/2025 1111 1=Yes filed at 02/12/2025 0847   Is your neck circumference greater than 17 inches (Male) or 16 inches (Female)? -- 0 filed at 02/12/2025 0847   Gender - Male 1=Yes filed at 02/28/2025 1111 1=Yes filed at 02/12/2025 0847   STOP-BANG Total Score -- 3 filed at 02/12/2025 0847          Prodigy: High Risk  Total Score: 16              Prodigy Age Score       Prodigy Gender Score          ARISCAT Score for Postoperative Pulmonary Complications      Flowsheet Row Pre-Admission Testing from 2/10/2025 in [facility]   Age Calculated Score 3 filed at 02/10/2025 0951   Preoperative SpO2 0 filed at 02/10/2025 0951   Respiratory infection in the last month Either upper or lower (i.e., URI, bronchitis, pneumonia), with fever and antibiotic treatment 0 filed at 02/10/2025 0951   Preoperative anemia (Hgb less than 10 g/dl) 0 filed at 02/10/2025 0951   Surgical incision  0 filed at 02/10/2025 0951   Duration of surgery  0 filed at 02/10/2025 0951   Emergency Procedure  0 filed at 02/10/2025 0951   ARISCAT Total Score  3 filed at 02/10/2025 0951          Kait Perioperative Risk for Myocardial Infarction or Cardiac Arrest (JENNIE)      Flowsheet Row Pre-Admission Testing from 2/10/2025 in [facility]   Calculated Age Score 1.38 filed at 02/10/2025 0951   Functional Status  0 filed at 02/10/2025 0951   ASA Class  -3.29 filed at 02/10/2025 0951   Creatinine 0 filed at 02/10/2025 0951   Type of Procedure  0.80 filed at 02/10/2025 0951   JENNIE Total Score  -6.36 filed at 02/10/2025 0951   JENNIE % 0.17 filed at 02/10/2025 0951            Assessment and Plan:     {[facility] ASSESSMENT AND PLAN:53677}

## 2025-03-03 ENCOUNTER — APPOINTMENT (OUTPATIENT)
Dept: PREADMISSION TESTING | Facility: HOSPITAL | Age: 70
End: 2025-03-03
Payer: COMMERCIAL

## 2025-03-05 ENCOUNTER — HOSPITAL ENCOUNTER (OUTPATIENT)
Facility: HOSPITAL | Age: 70
Setting detail: OUTPATIENT SURGERY
Discharge: HOME | End: 2025-03-05
Attending: ORTHOPAEDIC SURGERY | Admitting: ORTHOPAEDIC SURGERY
Payer: COMMERCIAL

## 2025-03-05 ENCOUNTER — APPOINTMENT (OUTPATIENT)
Dept: RADIOLOGY | Facility: HOSPITAL | Age: 70
End: 2025-03-05
Payer: COMMERCIAL

## 2025-03-05 VITALS
WEIGHT: 174.6 LBS | SYSTOLIC BLOOD PRESSURE: 152 MMHG | DIASTOLIC BLOOD PRESSURE: 86 MMHG | HEART RATE: 57 BPM | BODY MASS INDEX: 25.05 KG/M2 | OXYGEN SATURATION: 99 % | RESPIRATION RATE: 16 BRPM | TEMPERATURE: 97.7 F

## 2025-03-05 DIAGNOSIS — S62.615A DISPLACED FRACTURE OF PROXIMAL PHALANX OF LEFT RING FINGER, INITIAL ENCOUNTER FOR CLOSED FRACTURE: Primary | ICD-10-CM

## 2025-03-05 PROCEDURE — 20670 REMOVAL IMPLANT SUPERFICIAL: CPT | Performed by: ORTHOPAEDIC SURGERY

## 2025-03-05 PROCEDURE — 2500000004 HC RX 250 GENERAL PHARMACY W/ HCPCS (ALT 636 FOR OP/ED): Performed by: ORTHOPAEDIC SURGERY

## 2025-03-05 PROCEDURE — 7100000010 HC PHASE TWO TIME - EACH INCREMENTAL 1 MINUTE: Performed by: ORTHOPAEDIC SURGERY

## 2025-03-05 PROCEDURE — 3600000002 HC OR TIME - INITIAL BASE CHARGE - PROCEDURE LEVEL TWO: Performed by: ORTHOPAEDIC SURGERY

## 2025-03-05 PROCEDURE — 3600000007 HC OR TIME - EACH INCREMENTAL 1 MINUTE - PROCEDURE LEVEL TWO: Performed by: ORTHOPAEDIC SURGERY

## 2025-03-05 PROCEDURE — 2500000005 HC RX 250 GENERAL PHARMACY W/O HCPCS: Performed by: ORTHOPAEDIC SURGERY

## 2025-03-05 PROCEDURE — 7100000009 HC PHASE TWO TIME - INITIAL BASE CHARGE: Performed by: ORTHOPAEDIC SURGERY

## 2025-03-05 RX ORDER — BACITRACIN ZINC 500 UNIT/G
OINTMENT IN PACKET (EA) TOPICAL AS NEEDED
Status: DISCONTINUED | OUTPATIENT
Start: 2025-03-05 | End: 2025-03-05 | Stop reason: HOSPADM

## 2025-03-05 RX ORDER — DOXYCYCLINE 100 MG/1
100 CAPSULE ORAL 2 TIMES DAILY
Qty: 14 CAPSULE | Refills: 0 | Status: SHIPPED | OUTPATIENT
Start: 2025-03-05 | End: 2025-03-12

## 2025-03-05 RX ORDER — BUPIVACAINE HYDROCHLORIDE 5 MG/ML
INJECTION, SOLUTION PERINEURAL AS NEEDED
Status: DISCONTINUED | OUTPATIENT
Start: 2025-03-05 | End: 2025-03-05 | Stop reason: HOSPADM

## 2025-03-05 RX ORDER — LIDOCAINE HYDROCHLORIDE 10 MG/ML
INJECTION, SOLUTION INFILTRATION; PERINEURAL AS NEEDED
Status: DISCONTINUED | OUTPATIENT
Start: 2025-03-05 | End: 2025-03-05 | Stop reason: HOSPADM

## 2025-03-05 ASSESSMENT — PAIN SCALES - GENERAL
PAINLEVEL_OUTOF10: 0 - NO PAIN
PAINLEVEL_OUTOF10: 0 - NO PAIN

## 2025-03-05 ASSESSMENT — PAIN - FUNCTIONAL ASSESSMENT: PAIN_FUNCTIONAL_ASSESSMENT: 0-10

## 2025-03-05 NOTE — OP NOTE
REMOVAL, HARDWARE, HAND OR WRIST ( Mini C -arm ) (L) Operative Note     Date: 3/5/2025  OR Location: [facility]    Name: Ludwig Alcazar, : 1955, Age: 69 y.o., MRN: [id number], Sex: male    Diagnosis  Pre-op Diagnosis      * Displaced fracture of proximal phalanx of left ring finger, initial encounter for closed fracture [E62.048V] Post-op Diagnosis     * Displaced fracture of proximal phalanx of left ring finger, initial encounter for closed fracture [S62.509S]     Procedures  REMOVAL, HARDWARE, HAND OR WRIST ( Mini C -arm )  58552 - MS REMOVAL IMPLANT SUPERFICIAL SEPARATE PROCEDURE    Removal subcutaneous 2 pins left ring finger, buried  Surgeons      * Igor Stapleton - Primary    Resident/Fellow/Other Assistant:  Surgeons and Role:  * No surgeons found with a matching role *    Staff:   Scrub Person: Ghada  Circulator: Christy  Circulator: Catarina  Scrub Person: Rena    Anesthesia Staff: No anesthesia staff entered.    Procedure Summary  Anesthesia: Anesthesia type not filed in the log.  ASA: ASA status not filed in the log.  Estimated Blood Loss: 2mL  Intra-op Medications:   Administrations occurring from 0745 to 0850 on 25:   Medication Name Total Dose   BUPivacaine HCl (Marcaine) 0.5 % (5 mg/mL) injection 2 mL   lidocaine (Xylocaine) 10 mg/mL (1 %) injection 2 mL         Intraprocedure I/O Totals       None           Specimen: No specimens collected              Drains and/or Catheters: * None in log *    Tourniquet Times:   * Missing tourniquet times found for documented tourniquets in lo *     Implants:     Findings: Good healing good alignment    Indications: Ludwig Alcazar is an 69 y.o. male who is having surgery for Displaced fracture of proximal phalanx of left ring finger, initial encounter for closed fracture [V42.437N].  Pleasant gentleman understands after pin removal he needs to be carefully talk about wound care watching for infection.  Understands gentle range of  motion is fine wished to proceed informed consent obtained    The patient was seen in the preoperative area. The risks, benefits, complications, treatment options, non-operative alternatives, expected recovery and outcomes were discussed with the patient. The possibilities of reaction to medication, pulmonary aspiration, injury to surrounding structures, bleeding, recurrent infection, the need for additional procedures, failure to diagnose a condition, and creating a complication requiring transfusion or operation were discussed with the patient. The patient concurred with the proposed plan, giving informed consent.  The site of surgery was properly noted/marked if necessary per policy. The patient has been actively warmed in preoperative area. Preoperative antibiotics are not indicated. Venous thrombosis prophylaxis are not indicated.    Procedure Details: Chago gentleman brought the operating room and after sterilely prepping draping performing a timeout we placed local we did not use a tourniquet and made a stab incision with 11 blade down the subcutaneous tissue found both pins and these were removed easily copious irrigated mini C arm views showed good healing good alignment.  We copious irrigated we did place a chromic stitch and bulky dressing was placed he is roland take this off in 4 to 5 days we gave him postoperative instructions.  No complications  Complications:  None; patient tolerated the procedure well.    Disposition: PACU - hemodynamically stable.  Condition: stable                 Additional Details: 0    Attending Attestation: I performed the procedure.    Igor Stapleton  Phone Number: [phone]

## 2025-03-05 NOTE — NURSING NOTE
Patient had a local procedure and arrived directly from the OR to phase 2.     Patient dressed self, tolerated po intake, and discharge instructions were discussed with all questions at this time answered.     Patient able to ambulate self safely to discharge.

## 2025-03-05 NOTE — DISCHARGE INSTRUCTIONS
You had hand surgery today, due to local anesthesia the hand may be numb for a few hours after surgery, this is very normal and should slowly wear off.    Keep dressing on hand for 5 days, after that you may take dressing off and put a band-aid over wound.    DO NOT get pin sites wet for 5 days post-op, after that you can shower/wash hand and get wound wet, pat dry.    If able, take 800mg Ibuprofen and/or Tylenol for pain after surgery if needed.    Use the hand for activities as tolerated, begin regaining motion of the ring finger over a few weeks but no heavy lifting.    Follow up in the office by calling [phone] in 10-13 days for a post-op appt

## 2025-03-17 ENCOUNTER — APPOINTMENT (OUTPATIENT)
Dept: ORTHOPEDIC SURGERY | Facility: CLINIC | Age: 70
End: 2025-03-17
Payer: COMMERCIAL

## 2025-03-17 ENCOUNTER — HOSPITAL ENCOUNTER (OUTPATIENT)
Dept: RADIOLOGY | Facility: CLINIC | Age: 70
Discharge: HOME | End: 2025-03-17
Payer: COMMERCIAL

## 2025-03-17 DIAGNOSIS — S62.615A CLOSED DISPLACED FRACTURE OF PROXIMAL PHALANX OF LEFT RING FINGER, INITIAL ENCOUNTER: ICD-10-CM

## 2025-03-17 DIAGNOSIS — S62.615A DISPLACED FRACTURE OF PROXIMAL PHALANX OF LEFT RING FINGER, INITIAL ENCOUNTER FOR CLOSED FRACTURE: Primary | ICD-10-CM

## 2025-03-17 DIAGNOSIS — S62.615A DISPLACED FRACTURE OF PROXIMAL PHALANX OF LEFT RING FINGER, INITIAL ENCOUNTER FOR CLOSED FRACTURE: ICD-10-CM

## 2025-03-17 PROCEDURE — 1036F TOBACCO NON-USER: CPT | Performed by: ORTHOPAEDIC SURGERY

## 2025-03-17 PROCEDURE — 73130 X-RAY EXAM OF HAND: CPT | Mod: LT

## 2025-03-17 PROCEDURE — 1160F RVW MEDS BY RX/DR IN RCRD: CPT | Performed by: ORTHOPAEDIC SURGERY

## 2025-03-17 PROCEDURE — 73130 X-RAY EXAM OF HAND: CPT | Mod: LEFT SIDE | Performed by: RADIOLOGY

## 2025-03-17 PROCEDURE — 1159F MED LIST DOCD IN RCRD: CPT | Performed by: ORTHOPAEDIC SURGERY

## 2025-03-17 PROCEDURE — 99024 POSTOP FOLLOW-UP VISIT: CPT | Performed by: ORTHOPAEDIC SURGERY

## 2025-03-17 ASSESSMENT — PATIENT HEALTH QUESTIONNAIRE - PHQ9
SUM OF ALL RESPONSES TO PHQ9 QUESTIONS 1 AND 2: 0
1. LITTLE INTEREST OR PLEASURE IN DOING THINGS: NOT AT ALL
2. FEELING DOWN, DEPRESSED OR HOPELESS: NOT AT ALL

## 2025-03-17 ASSESSMENT — ENCOUNTER SYMPTOMS
LOSS OF SENSATION IN FEET: 0
DEPRESSION: 0
OCCASIONAL FEELINGS OF UNSTEADINESS: 0

## 2025-03-17 ASSESSMENT — PAIN - FUNCTIONAL ASSESSMENT: PAIN_FUNCTIONAL_ASSESSMENT: NO/DENIES PAIN

## 2025-03-17 NOTE — PROGRESS NOTES
Reason for Appointment  Chief Complaint   Patient presents with    Left Hand - Post-op     History of Present Illness  Patient is here today 2 weeks s/p left hand hardware removal on 3/5/25. Patient is doing well overall. X-rays taken of the left hand on 3/17/25 were reviewed and showed excellent alignment and healing of the left ring proximal phalangeal fracture. We removed his one stitch. Incisions look excellent. We discussed gentle stretching. Avoiding heavy activities. He understands the postoperative protocol. He will follow up in 4 weeks with repeat x-rays.       Assessment   Closed displaced fracture of proximal phalanx of left ring finger, initial encounter  Displaced fracture of proximal phalanx of left ring finger, initial encounter for closed fracture      I, Alyssa Ahumada, attest that this documentation has been prepared under the direction and in the presence of Igor Stapleton MD.   By signing below, IIgor MD, personally performed the services described in this documentation. All medical record entries made by the scribe were at my direction and in my presence. I have reviewed the chart and agree that the record reflects my personal performance and is accurate and complete.

## 2025-04-14 ENCOUNTER — HOSPITAL ENCOUNTER (OUTPATIENT)
Dept: RADIOLOGY | Facility: CLINIC | Age: 70
Discharge: HOME | End: 2025-04-14
Payer: COMMERCIAL

## 2025-04-14 ENCOUNTER — APPOINTMENT (OUTPATIENT)
Dept: ORTHOPEDIC SURGERY | Facility: CLINIC | Age: 70
End: 2025-04-14
Payer: COMMERCIAL

## 2025-04-14 DIAGNOSIS — S62.615A DISPLACED FRACTURE OF PROXIMAL PHALANX OF LEFT RING FINGER, INITIAL ENCOUNTER FOR CLOSED FRACTURE: Primary | ICD-10-CM

## 2025-04-14 DIAGNOSIS — S62.615A DISPLACED FRACTURE OF PROXIMAL PHALANX OF LEFT RING FINGER, INITIAL ENCOUNTER FOR CLOSED FRACTURE: ICD-10-CM

## 2025-04-14 PROCEDURE — 1036F TOBACCO NON-USER: CPT | Performed by: PHYSICIAN ASSISTANT

## 2025-04-14 PROCEDURE — 1159F MED LIST DOCD IN RCRD: CPT | Performed by: PHYSICIAN ASSISTANT

## 2025-04-14 PROCEDURE — 73140 X-RAY EXAM OF FINGER(S): CPT | Mod: LEFT SIDE | Performed by: RADIOLOGY

## 2025-04-14 PROCEDURE — 1160F RVW MEDS BY RX/DR IN RCRD: CPT | Performed by: PHYSICIAN ASSISTANT

## 2025-04-14 PROCEDURE — 73140 X-RAY EXAM OF FINGER(S): CPT | Mod: LT

## 2025-04-14 PROCEDURE — 99024 POSTOP FOLLOW-UP VISIT: CPT | Performed by: PHYSICIAN ASSISTANT

## 2025-04-14 ASSESSMENT — PAIN - FUNCTIONAL ASSESSMENT: PAIN_FUNCTIONAL_ASSESSMENT: 0-10

## 2025-04-14 ASSESSMENT — PAIN SCALES - GENERAL: PAINLEVEL_OUTOF10: 0 - NO PAIN

## 2025-04-14 NOTE — PROGRESS NOTES
Reason for Appointment  Chief Complaint   Patient presents with    Left Hand - Post-op     History of Present Illness  Patient is here today 6 weeks status post a left ring finger hardware removal for a proximal phalanx fracture.  X-rays taken today are reviewed and look excellent, fracture is almost fully healed.  He is working on regaining full motion, almost has full flexion back in the digit good extension.  He is doing more activity with the hand and we did discuss being careful with too much heavy lifting and activity.  He can follow-up with us in 2 months with final x-rays of the left ring finger.    Assessment   Encounter Diagnosis   Name Primary?    Displaced fracture of proximal phalanx of left ring finger, initial encounter for closed fracture Yes

## 2025-06-16 ENCOUNTER — HOSPITAL ENCOUNTER (OUTPATIENT)
Dept: RADIOLOGY | Facility: CLINIC | Age: 70
Discharge: HOME | End: 2025-06-16
Payer: COMMERCIAL

## 2025-06-16 ENCOUNTER — APPOINTMENT (OUTPATIENT)
Dept: ORTHOPEDIC SURGERY | Facility: CLINIC | Age: 70
End: 2025-06-16
Payer: COMMERCIAL

## 2025-06-16 DIAGNOSIS — S62.615A CLOSED DISPLACED FRACTURE OF PROXIMAL PHALANX OF LEFT RING FINGER, INITIAL ENCOUNTER: ICD-10-CM

## 2025-06-16 DIAGNOSIS — S62.615A CLOSED DISPLACED FRACTURE OF PROXIMAL PHALANX OF LEFT RING FINGER, INITIAL ENCOUNTER: Primary | ICD-10-CM

## 2025-06-16 PROCEDURE — 99213 OFFICE O/P EST LOW 20 MIN: CPT | Performed by: ORTHOPAEDIC SURGERY

## 2025-06-16 PROCEDURE — 73140 X-RAY EXAM OF FINGER(S): CPT | Mod: LEFT SIDE | Performed by: RADIOLOGY

## 2025-06-16 PROCEDURE — 1159F MED LIST DOCD IN RCRD: CPT | Performed by: ORTHOPAEDIC SURGERY

## 2025-06-16 PROCEDURE — 73140 X-RAY EXAM OF FINGER(S): CPT | Mod: LT

## 2025-06-16 ASSESSMENT — ENCOUNTER SYMPTOMS
WHEEZING: 0
CHILLS: 0
FATIGUE: 0
WOUND: 0
SORE THROAT: 0
SHORTNESS OF BREATH: 0
ARTHRALGIAS: 0
FEVER: 0
JOINT SWELLING: 0
SINUS PAIN: 0

## 2025-06-16 ASSESSMENT — PAIN - FUNCTIONAL ASSESSMENT: PAIN_FUNCTIONAL_ASSESSMENT: 0-10

## 2025-06-16 ASSESSMENT — PAIN SCALES - GENERAL: PAINLEVEL_OUTOF10: 0 - NO PAIN

## 2025-06-16 NOTE — PROGRESS NOTES
Reason for Appointment  Chief Complaint   Patient presents with    Left Ring Finger - Fracture, Follow-up     History of Present Illness  Patient is a 69 y.o. male here today for follow-up evaluation 3-1/2 months status post a left ring finger pin removal for a proximal phalanx fracture.  He is doing very well, having no pain or issues with the finger today.  X-rays taken today are reviewed and show a fully healed fracture.  He has full motion back in the digit.  No other changes in his past medical history, allergies, or medications    Medical History[1]    Surgical History[2]    Medication Documentation Review Audit       Reviewed by Stacey Walters MA (Medical Assistant) on 06/16/25 at 0915      Medication Order Taking? Sig Documenting Provider Last Dose Status   atorvastatin (Lipitor) 80 mg tablet [id number] Yes Take 1 tablet (80 mg) by mouth at 3:00 in the morning. Arin Hoskins MD 3/4/2025 Active   cyanocobalamin (Vitamin B-12) 1,000 mcg tablet [id number] Yes Take 0.5 tablets (500 mcg) by mouth once daily in the morning. Arin Hoskins MD 3/4/2025 Active   lisinopriL-hydrochlorothiazide 20-12.5 mg tablet [id number] Yes Take 0.5 tablets by mouth 2 times a day. Arin Hoskins MD 3/4/2025 Active   MAGNESIUM ORAL [id number] Yes Take 500 mg by mouth once daily in the evening. Take with meals. Arin Hoskins MD 3/4/2025 Active   metoprolol succinate XL (Toprol-XL) 200 mg 24 hr tablet [id number] Yes Take 0.5 tablets (100 mg) by mouth twice a day. Arin Hoskins MD 3/4/2025 Active   pantoprazole (ProtoNix) 40 mg EC tablet [id number] Yes Take 1 tablet (40 mg) by mouth once daily in the morning. Take before meals. Arin Hoskins MD 3/5/2025 Morning Active   potassium chloride CR 20 mEq ER tablet [id number] Yes Take 1 tablet (20 mEq) by mouth once daily in the evening. Take with meals. Arin Hoskins MD 3/4/2025 Active   tamsulosin (Flomax) 0.4 mg 24 hr capsule [id number] Yes Take 1 capsule  (0.4 mg) by mouth at 3:00 in the morning. Historical Provider, MD 3/4/2025 Active   warfarin (Coumadin) 7.5 mg tablet [id number] Yes Take 1 tablet (7.5 mg) by mouth once daily in the evening. Historical Provider, MD 2/28/2025 Active                    RX Allergies[3]    Review of Systems   Constitutional:  Negative for chills, fatigue and fever.   HENT:  Negative for nosebleeds, sinus pain and sore throat.    Respiratory:  Negative for shortness of breath and wheezing.    Cardiovascular:  Negative for chest pain and leg swelling.   Musculoskeletal:  Negative for arthralgias and joint swelling.   Skin:  Negative for pallor and wound.     Exam   On exam he has no swelling in the finger today.  The left hand shows full digital motion.  Good pulses and sensation in the upper extremity    Assessment   Encounter Diagnosis   Name Primary?    Closed displaced fracture of proximal phalanx of left ring finger, initial encounter      Plan   He is doing very well, having no issues today in the hand.  Back to doing all his regular activity, and fracture is fully healed at this point.  We be happy see him back at any point for any further issues.    I, Ghada Rojo PA-C, am acting as a scribe and attest that this documentation has been prepared under the direction and in the presence of Igor Stapleton MD.    By signing below, I, Igor Stapleton MD, personally performed the services described in this documentation. All medical record entries made by the scribe were at my direction and in my presence. I have reviewed the chart and agree that the record reflects my personal performance and is accurate and complete.                       [1]   Past Medical History:  Diagnosis Date    Arthritis     BPH (benign prostatic hyperplasia)     Coronary artery disease     GERD (gastroesophageal reflux disease)     GI (gastrointestinal bleed)     Heart valve disease     Hiatal hernia     Hyperlipidemia     Hypertension     Vision loss    [2]    Past Surgical History:  Procedure Laterality Date    AORTIC VALVE REPLACEMENT      mechanical 2011    COLONOSCOPY      FINGER SURGERY Right     middle, surgery to repair fracture    FINGER SURGERY Left     ring finger. pinning    OTHER SURGICAL HISTORY  08/03/2020    Heart surgery, CABG    PROSTATE BIOPSY      STOMACH SURGERY      for GI bleed, clip   [3]   Allergies  Allergen Reactions    Statins-Hmg-Coa Reductase Inhibitors Unknown     Muscle issues with Rosuvastatin per pt report

## (undated) DEVICE — DRAPE, MINI C-ARM, 54 X 78

## (undated) DEVICE — GLOVE, SURGICAL, PROTEXIS PI BLUE W/NEUTHERA, 7.0, PF, LF

## (undated) DEVICE — Device

## (undated) DEVICE — COVER, MINI DRAPE SET, C-ARM, FOR OEC/GE

## (undated) DEVICE — WIRE, C-WIRE PAK, ORTHO, DBL ENDED, SPADE TIP, .045IN

## (undated) DEVICE — GLOVE, SURGICAL, PROTEXIS PI , 6.5, PF, LF

## (undated) DEVICE — DRESSING, NON-ADHERENT, 3 X 3 IN, STERILE

## (undated) DEVICE — BLANKET, LOWER BODY, VHA PLUS, ADULT